# Patient Record
Sex: FEMALE | Race: WHITE | Employment: OTHER | URBAN - METROPOLITAN AREA
[De-identification: names, ages, dates, MRNs, and addresses within clinical notes are randomized per-mention and may not be internally consistent; named-entity substitution may affect disease eponyms.]

---

## 2017-01-09 ENCOUNTER — OFFICE VISIT (OUTPATIENT)
Dept: INTERNAL MEDICINE CLINIC | Age: 72
End: 2017-01-09

## 2017-01-09 VITALS
OXYGEN SATURATION: 99 % | DIASTOLIC BLOOD PRESSURE: 80 MMHG | WEIGHT: 178.6 LBS | TEMPERATURE: 97.8 F | SYSTOLIC BLOOD PRESSURE: 138 MMHG | HEIGHT: 67 IN | HEART RATE: 68 BPM | BODY MASS INDEX: 28.03 KG/M2 | RESPIRATION RATE: 12 BRPM

## 2017-01-09 DIAGNOSIS — Q61.3 POLYCYSTIC KIDNEY DISEASE: ICD-10-CM

## 2017-01-09 DIAGNOSIS — E78.49 OTHER HYPERLIPIDEMIA: ICD-10-CM

## 2017-01-09 DIAGNOSIS — R94.4 DECREASED GFR: ICD-10-CM

## 2017-01-09 DIAGNOSIS — I10 ESSENTIAL HYPERTENSION: ICD-10-CM

## 2017-01-09 DIAGNOSIS — Z00.00 MEDICARE ANNUAL WELLNESS VISIT, SUBSEQUENT: Primary | ICD-10-CM

## 2017-01-09 DIAGNOSIS — Z78.0 POST-MENOPAUSAL: ICD-10-CM

## 2017-01-09 NOTE — PROGRESS NOTES
HISTORY OF PRESENT ILLNESS  Honey Acosta is a 66 y.o. female. HPI  Cardiovascular Review:  The patient has hypertension, hyperlipidemia and Palpitations. Diet and Lifestyle: generally follows a low fat low cholesterol diet, generally follows a low sodium diet, exercises sporadically, nonsmoker Yoga and walking  Home BP Monitoring: is not measured at home. Pertinent ROS: taking medications as instructed, no medication side effects noted, no TIA's, no chest pain on exertion, no dyspnea on exertion, no swelling of ankles. Stopped Red Yeast rice due to myalgias     Interval History  CKD- followed closely by Dr. Nicolle Fuchs- in October 2016, RICE and boot at home. Improving    RLS/ Insomnia- has improved with gabapentin. She did not tolerate the     SHx: Joined the [employer] ; Taking [employer] to [place]     This is a Subsequent Rito Visit providing Personalized Prevention Plan Services (PPPS) (Performed 12 months after initial AWV and PPPS )    I have reviewed the patient's medical history in detail and updated the computerized patient record. History     Past Medical History   Diagnosis Date    Hypertension     Joint pain     Polycystic kidney disease       Past Surgical History   Procedure Laterality Date    Hx appendectomy      Hx orthopaedic      Hx partial hysterectomy       Current Outpatient Prescriptions   Medication Sig Dispense Refill    omega-3 acid ethyl esters (LOVAZA) 1 gram capsule Take 2 pills twice daily 120 Cap 2    gabapentin (NEURONTIN) 100 mg capsule TAKE 2 CAPS BY MOUTH NIGHTLY AS NEEDED. 60 Cap 4    ACIPHEX 20 mg tablet TAKE 1 TAB BY MOUTH DAILY. 90 Tab 1    lisinopril (PRINIVIL, ZESTRIL) 2.5 mg tablet TAKE 1 TAB BY MOUTH DAILY. 90 Tab 1    lisinopril (PRINIVIL, ZESTRIL) 2.5 mg tablet TAKE 1 TAB BY MOUTH DAILY.  30 Tab 3    metoprolol (LOPRESSOR) 25 mg tablet TAKE 1 AND 1/2 TABLETS BY MOUTH EVERY  Tab 2    omega-3 acid ethyl esters (LOVAZA) 1 gram capsule TAKES 2 PILLS TWICE A DAY. 120 Cap 2    TURMERIC ROOT EXTRACT PO Take  by mouth.  coenzyme q10-vitamin e 100-100 mg-unit cap Take  by mouth.  Cholecalciferol, Vitamin D3, (VITAMIN D3) 2,000 unit cap capsule Take  by mouth two (2) times a day.  estradiol (ESTRACE) 0.01 % (0.1 mg/gram) vaginal cream Insert 2 g into vagina daily.  fexofenadine (ALLEGRA) 180 mg tablet Take  by mouth.  ramelteon (ROZEREM) 8 mg tablet Take 1 Tab by mouth nightly as needed for Sleep. 30 mins before bedtime 30 Tab 1    mometasone (NASONEX) 50 mcg/actuation nasal spray 2 Sprays by Both Nostrils route two (2) times a day.  red yeast rice extract 600 mg cap Take 600 mg by mouth now. Allergies   Allergen Reactions    Codeine Nausea Only    Nitroglycerin Other (comments)    Statins-Hmg-Coa Reductase Inhibitors Myalgia     Family History   Problem Relation Age of Onset    Cancer Brother      Social History   Substance Use Topics    Smoking status: Never Smoker    Smokeless tobacco: Not on file    Alcohol use 0.0 oz/week     0 Standard drinks or equivalent per week     Patient Active Problem List   Diagnosis Code    GERD (gastroesophageal reflux disease) K21.9    HTN (hypertension) I10    HLD (hyperlipidemia) E78.5    Polycystic kidney disease Q61.3    PVC's (premature ventricular contractions) I49.3    RBBB I45.10       Depression Risk Factor Screening:     PHQ 2 / 9, over the last two weeks 1/9/2017   Little interest or pleasure in doing things Not at all   Feeling down, depressed or hopeless Not at all   Total Score PHQ 2 0     Alcohol Risk Factor Screening: On any occasion during the past 3 months, have you had more than 3 drinks containing alcohol? Yes    Do you average more than 7 drinks per week? No      Functional Ability and Level of Safety:     Hearing Loss   none    Activities of Daily Living   Self-care.    Requires assistance with: no ADLs    Fall Risk     Fall Risk Assessment, last 12 mths 1/9/2017   Able to walk? Yes   Fall in past 12 months? Yes   Fall with injury? Yes   Number of falls in past 12 months 1   Fall Risk Score 2     Abuse Screen   Patient is not abused lives with [facility]- 43 years in April, ROS per HPI    Physical Exam   Constitutional: She is oriented to person, place, and time. She appears well-developed and well-nourished. HENT:   Right Ear: External ear normal.   Left Ear: External ear normal.   Mouth/Throat: Oropharynx is clear and moist. No oropharyngeal exudate. Eyes: Conjunctivae are normal. No scleral icterus. Neck: Normal range of motion. Neck supple. No thyromegaly present. Cardiovascular: Normal rate, regular rhythm and normal heart sounds. Exam reveals no gallop and no friction rub. No murmur heard. Pulmonary/Chest: Effort normal and breath sounds normal. No respiratory distress. She has no wheezes. She has no rales. She exhibits no tenderness. Abdominal: Soft. Bowel sounds are normal. She exhibits no distension. There is no tenderness. There is no rebound and no guarding. Genitourinary:   Genitourinary Comments: Not indicated. Musculoskeletal: Normal range of motion. She exhibits no edema or tenderness. Neurological: She is alert and oriented to person, place, and time. Skin:   Multiple nevi on trunk s/p derm evaluation <12mths. Pt endorses no worrisome changes     Psychiatric: She has a normal mood and affect.         Evaluation of Cognitive Function:  Mood/affect:  Happy   Appearance: younger than stated age  Family member/caregiver input: n/a         Patient Care Team:  Scot Castellanos MD as PCP - General (Internal Medicine)    ASSESSMENT and PLAN  Advice/Referrals/Counseling   Education and counseling provided:  Are appropriate based on today's review and evaluation  Pneumococcal Vaccine- will confirm receipt     Assessment/Plan   Helen Cody was seen today for annual wellness visit.    Diagnoses and all orders for this visit:    Medicare annual wellness visit, subsequent- Health Maintenance reviewed     Other hyperlipidemia- check Lipids.   -     LIPID PANEL    Post-menopausal- due for DEXA  -     DEXA BONE DENSITY STUDY AXIAL; Future    Essential hypertension- well controlled. Counseled on diet and exercise. Continue current regimen. Decreased GFR- per Nephrology. Records reviewed    Polycystic kidney disease- per Urology. Obtain records       Follow-up Disposition:  Return in about 6 months (around 7/9/2017) for Follow-up HLD . Medication risks/benefits/costs/interactions/alternatives discussed with patient. Lea Lundy  was given an after visit summary which includes diagnoses, current medications, & vitals. she expressed understanding with the diagnosis and plan.

## 2017-01-13 ENCOUNTER — TELEPHONE (OUTPATIENT)
Dept: INTERNAL MEDICINE CLINIC | Age: 72
End: 2017-01-13

## 2017-01-13 NOTE — TELEPHONE ENCOUNTER
[facility] [phone]     Pharm calling back to let patel know that this pt has only had flu shots with them the last one being 9/30/16, they did receive a script for the [id number] vaccine, but that she never had it done.

## 2017-01-23 RX ORDER — METOPROLOL TARTRATE 25 MG/1
TABLET, FILM COATED ORAL
Qty: 135 TAB | Refills: 2 | Status: SHIPPED | OUTPATIENT
Start: 2017-01-23 | End: 2018-01-16 | Stop reason: SDUPTHER

## 2017-02-21 ENCOUNTER — HOSPITAL ENCOUNTER (OUTPATIENT)
Dept: BONE DENSITY | Age: 72
Discharge: HOME | End: 2017-02-21
Attending: INTERNAL MEDICINE
Payer: MEDICARE

## 2017-02-21 DIAGNOSIS — Z78.0 POST-MENOPAUSAL: ICD-10-CM

## 2017-02-21 PROCEDURE — 77080 DXA BONE DENSITY AXIAL: CPT

## 2017-03-01 ENCOUNTER — DOCUMENTATION ONLY (OUTPATIENT)
Dept: INTERNAL MEDICINE CLINIC | Age: 72
End: 2017-03-01

## 2017-03-22 DIAGNOSIS — N60.09 BREAST CYST, UNSPECIFIED LATERALITY: Primary | ICD-10-CM

## 2017-04-17 ENCOUNTER — OFFICE VISIT (OUTPATIENT)
Dept: SURGERY | Age: 72
End: 2017-04-17

## 2017-04-17 ENCOUNTER — DOCUMENTATION ONLY (OUTPATIENT)
Dept: SURGERY | Age: 72
End: 2017-04-17

## 2017-04-17 VITALS
WEIGHT: 178 LBS | DIASTOLIC BLOOD PRESSURE: 79 MMHG | BODY MASS INDEX: 27.94 KG/M2 | HEIGHT: 67 IN | SYSTOLIC BLOOD PRESSURE: 136 MMHG | HEART RATE: 78 BPM

## 2017-04-17 DIAGNOSIS — N60.02 BILATERAL BREAST CYSTS: Primary | ICD-10-CM

## 2017-04-17 DIAGNOSIS — N60.01 BILATERAL BREAST CYSTS: Primary | ICD-10-CM

## 2017-04-17 NOTE — LETTER
4/17/2017 11:00 AM 
 
Patient:  Alphonso Owusu YOB: 1945 Date of Visit: 4/17/2017 Dear Pamela Marie MD 
[address] [place] [address] [address] [phone] VIA In Basket 
 : Thank you for referring Ms. Lawanda Johnson to me for evaluation/treatment. Below are the relevant portions of my assessment and plan of care. HISTORY OF PRESENT ILLNESS 
Alphonso Owusu is a 66 y.o. female. HPI NEW Patient presents for consultation at the request of Dr. Marla Knapp for second opinion of abnormal mammogram. Patient is not able to palpate any lumps. She states that she has dense breasts. No history of breast biopsies or breast surgeries. No breast pain. Mammogram and ultrasound done 2/16/17 at [facility]: BI-RADS 4. Report scanned into [facility]. Complex cyst at 9:00 bilateral breasts measuring 5 mm and 3 mm. FH: 
- Maternal grandmother diagnosed with breast cancer in her 66's. - Maternal cousin diagnosed with breast cancer in her 66's. Past Medical History:  
Diagnosis Date  Hypercholesterolemia  Hypertension  Joint pain  Melanoma ([address]  Polycystic kidney disease  Polycystic kidney disease  Restless leg syndrome Past Surgical History:  
Procedure Laterality Date  HX APPENDECTOMY  HX BUNIONECTOMY  2002  HX ORTHOPAEDIC    
 HX OTHER SURGICAL  1995  
 surgery for melanoma  HX OTHER SURGICAL Bilateral 2008 and 2014  
 aspiration of cysts BILATERAL kidneys  HX PARTIAL HYSTERECTOMY Social History Social History  Marital status:  Spouse name: N/A  
 Number of children: N/A  
 Years of education: N/A Occupational History  Not on file. Social History Main Topics  Smoking status: Never Smoker  Smokeless tobacco: Not on file  Alcohol use 0.6 oz/week 1 Standard drinks or equivalent per week  Drug use: No  
 Sexual activity: No  
 
Other Topics Concern  Not on file Social History Narrative OB History Obstetric Comments Menarche:  13. LMP: 50.  # of Children:  1. Age at Delivery of First Child:  33.   Hysterectomy/oophorectomy:  YES/NO. Breast Bx:  no.  Hx of Breast Feeding:  no. BCP:  yes. Hormone therapy:  yes. Current Outpatient Prescriptions:  
  metoprolol tartrate (LOPRESSOR) 25 mg tablet, TAKE 1 & 1/2 TABLET BY MOUTH EVERY DAY, Disp: 135 Tab, Rfl: 2 
  omega-3 acid ethyl esters (LOVAZA) 1 gram capsule, Take 2 pills twice daily, Disp: 120 Cap, Rfl: 2 
  gabapentin (NEURONTIN) 100 mg capsule, TAKE 2 CAPS BY MOUTH NIGHTLY AS NEEDED., Disp: 60 Cap, Rfl: 4 
  ACIPHEX 20 mg tablet, TAKE 1 TAB BY MOUTH DAILY. , Disp: 90 Tab, Rfl: 1 
  lisinopril (PRINIVIL, ZESTRIL) 2.5 mg tablet, TAKE 1 TAB BY MOUTH DAILY. , Disp: 30 Tab, Rfl: 3 
  omega-3 acid ethyl esters (LOVAZA) 1 gram capsule, TAKES 2 PILLS TWICE A DAY., Disp: 120 Cap, Rfl: 2 
  TURMERIC ROOT EXTRACT PO, Take  by mouth., Disp: , Rfl:  
  coenzyme q10-vitamin e 100-100 mg-unit cap, Take  by mouth., Disp: , Rfl:  
  Cholecalciferol, Vitamin D3, (VITAMIN D3) 2,000 unit cap capsule, Take  by mouth two (2) times a day., Disp: , Rfl:  
  estradiol (ESTRACE) 0.01 % (0.1 mg/gram) vaginal cream, Insert 2 g into vagina daily. , Disp: , Rfl:  
  fexofenadine (ALLEGRA) 180 mg tablet, Take  by mouth., Disp: , Rfl:  
Allergies Allergen Reactions  Codeine Nausea Only  Nitroglycerin Other (comments)  Red Yeast Rice Myalgia  Rozerem [Ramelteon] Other (comments) Headache  Statins-Hmg-Coa Reductase Inhibitors Myalgia Review of Systems Constitutional: Negative. Weight gain HENT: Negative. Eyes: Negative. Respiratory: Negative. Cardiovascular: Negative. Gastrointestinal: Negative. Genitourinary: Positive for frequency. Musculoskeletal: Positive for back pain and joint pain. Skin: Negative. Neurological: Negative. Endo/Heme/Allergies: Negative. Psychiatric/Behavioral: Negative. Physical Exam  
Constitutional: She is oriented to person, place, and time. She appears well-developed and well-nourished. HENT:  
Head: Normocephalic. Eyes: EOM are normal.  
Neck: Neck supple. Cardiovascular: Intact distal pulses. Pulmonary/Chest: Effort normal. Right breast exhibits no inverted nipple, no mass, no nipple discharge, no skin change and no tenderness. Left breast exhibits no inverted nipple, no mass, no nipple discharge, no skin change and no tenderness. Breasts are symmetrical.  
Abdominal: Soft. Musculoskeletal: Normal range of motion. Lymphadenopathy:  
  She has no cervical adenopathy. She has no axillary adenopathy. Neurological: She is alert and oriented to person, place, and time. Skin: Skin is warm and dry. Psychiatric: She has a normal mood and affect. Nursing note and vitals reviewed. ASSESSMENT and PLAN 
  ICD-10-CM ICD-9-CM 1. Bilateral breast cysts N60.01 610.0   
 N60.02    
 
65 yo female with bilateral complex cysts. I have reviewed her imaging and reports. I gave her the option of 6 month follow-up ultrasound vs. Core needle biopsies. She would like to opt for the ultrasound. It has already been 2 months since prior ultrasound so will schedule her for us in august. I did explain without tissue no way of knowing exactly what it is. She understood. If any change on next us recommend core needle biopsies. She was comfortable with the plan. I will see her back after the us. If you have questions, please do not hesitate to call me. I look forward to following Ms. Foley along with you. Sincerely, Blanca Sellers MD

## 2017-04-17 NOTE — PROGRESS NOTES
HISTORY OF PRESENT ILLNESS  Rosetta Hyde is a 66 y.o. female. HPI NEW Patient presents for consultation at the request of Dr. Riccardo Santiago for second opinion of abnormal mammogram. Patient is not able to palpate any lumps. She states that she has dense breasts. No history of breast biopsies or breast surgeries. No breast pain. Mammogram and ultrasound done 2/16/17 at [facility]: BI-RADS 4. Report scanned into [facility]. Complex cyst at 9:00 bilateral breasts measuring 5 mm and 3 mm. FH:  - Maternal grandmother diagnosed with breast cancer in her 66's. - Maternal cousin diagnosed with breast cancer in her 66's. Past Medical History:   Diagnosis Date    Hypercholesterolemia     Hypertension     Joint pain     [address] [address]     Polycystic kidney disease     Polycystic kidney disease     Restless leg syndrome      Past Surgical History:   Procedure Laterality Date    HX APPENDECTOMY      HX BUNIONECTOMY  2002    HX ORTHOPAEDIC      [address]    surgery for melanoma     HX OTHER SURGICAL Bilateral 2008 and 2014    aspiration of cysts BILATERAL kidneys    HX PARTIAL HYSTERECTOMY       Social History     Social History    Marital status:      Spouse name: N/A    Number of children: N/A    Years of education: N/A     Occupational History    Not on file. Social History Main Topics    Smoking status: Never Smoker    Smokeless tobacco: Not on file    Alcohol use 0.6 oz/week     1 Standard drinks or equivalent per week    Drug use: No    Sexual activity: No     Other Topics Concern    Not on file     Social History Narrative     OB History     Obstetric Comments    Menarche:  13. LMP: 50.  # of Children:  1. Age at Delivery of First Child:  33.   Hysterectomy/oophorectomy:  YES/NO. Breast Bx:  no.  Hx of Breast Feeding:  no. BCP:  yes. Hormone therapy:  yes.                Current Outpatient Prescriptions:     metoprolol tartrate (LOPRESSOR) 25 mg tablet, TAKE 1 & 1/2 TABLET BY MOUTH EVERY DAY, Disp: 135 Tab, Rfl: 2    omega-3 acid ethyl esters (LOVAZA) 1 gram capsule, Take 2 pills twice daily, Disp: 120 Cap, Rfl: 2    gabapentin (NEURONTIN) 100 mg capsule, TAKE 2 CAPS BY MOUTH NIGHTLY AS NEEDED., Disp: 60 Cap, Rfl: 4    ACIPHEX 20 mg tablet, TAKE 1 TAB BY MOUTH DAILY. , Disp: 90 Tab, Rfl: 1    lisinopril (PRINIVIL, ZESTRIL) 2.5 mg tablet, TAKE 1 TAB BY MOUTH DAILY. , Disp: 30 Tab, Rfl: 3    omega-3 acid ethyl esters (LOVAZA) 1 gram capsule, TAKES 2 PILLS TWICE A DAY., Disp: 120 Cap, Rfl: 2    TURMERIC ROOT EXTRACT PO, Take  by mouth., Disp: , Rfl:     coenzyme q10-vitamin e 100-100 mg-unit cap, Take  by mouth., Disp: , Rfl:     Cholecalciferol, Vitamin D3, (VITAMIN D3) 2,000 unit cap capsule, Take  by mouth two (2) times a day., Disp: , Rfl:     estradiol (ESTRACE) 0.01 % (0.1 mg/gram) vaginal cream, Insert 2 g into vagina daily. , Disp: , Rfl:     fexofenadine (ALLEGRA) 180 mg tablet, Take  by mouth., Disp: , Rfl:   Allergies   Allergen Reactions    Codeine Nausea Only    Nitroglycerin Other (comments)    Red Yeast Rice Myalgia    Rozerem [Ramelteon] Other (comments)     Headache     Statins-Hmg-Coa Reductase Inhibitors Myalgia       Review of Systems   Constitutional: Negative. Weight gain   HENT: Negative. Eyes: Negative. Respiratory: Negative. Cardiovascular: Negative. Gastrointestinal: Negative. Genitourinary: Positive for frequency. Musculoskeletal: Positive for back pain and joint pain. Skin: Negative. Neurological: Negative. Endo/Heme/Allergies: Negative. Psychiatric/Behavioral: Negative. Physical Exam   Constitutional: She is oriented to person, place, and time. She appears well-developed and well-nourished. HENT:   Head: Normocephalic. Eyes: EOM are normal.   Neck: Neck supple. Cardiovascular: Intact distal pulses.     Pulmonary/Chest: Effort normal. Right breast exhibits no inverted nipple, no mass, no nipple discharge, no skin change and no tenderness. Left breast exhibits no inverted nipple, no mass, no nipple discharge, no skin change and no tenderness. Breasts are symmetrical.   Abdominal: Soft. Musculoskeletal: Normal range of motion. Lymphadenopathy:     She has no cervical adenopathy. She has no axillary adenopathy. Neurological: She is alert and oriented to person, place, and time. Skin: Skin is warm and dry. Psychiatric: She has a normal mood and affect. Nursing note and vitals reviewed. ASSESSMENT and PLAN    ICD-10-CM ICD-9-CM    1. Bilateral breast cysts N60.01 610.0     N60.02       65 yo female with bilateral complex cysts. I have reviewed her imaging and reports. I gave her the option of 6 month follow-up ultrasound vs. Core needle biopsies. She would like to opt for the ultrasound. It has already been 2 months since prior ultrasound so will schedule her for us in august. I did explain without tissue no way of knowing exactly what it is. She understood. If any change on next us recommend core needle biopsies. She was comfortable with the plan. I will see her back after the us.

## 2017-04-17 NOTE — PATIENT INSTRUCTIONS
Breast Self-Exam: Care Instructions  Your Care Instructions  A breast self-exam is when you check your breasts for lumps or changes. This regular exam helps you learn how your breasts normally look and feel. Most breast problems or changes are not because of cancer. Breast self-exam is not a substitute for a mammogram. Having regular breast exams by your doctor and regular mammograms improve your chances of finding any problems with your breasts. Some women set a time each month to do a step-by-step breast self-exam. Other women like a less formal system. They might look at their breasts as they brush their teeth, or feel their breasts once in a while in the shower. If you notice a change in your breast, tell your doctor. Follow-up care is a key part of your treatment and safety. Be sure to make and go to all appointments, and call your doctor if you are having problems. Its also a good idea to know your test results and keep a list of the medicines you take. How do you do a breast self-exam?  · The best time to examine your breasts is usually one week after your menstrual period begins. Your breasts should not be tender then. If you do not have periods, you might do your exam on a day of the month that is easy to remember. · To examine your breasts:  ¨ Remove all your clothes above the waist and lie down. When you are lying down, your breast tissue spreads evenly over your chest wall, which makes it easier to feel all your breast tissue. ¨ Use the pads--not the fingertips--of the 3 middle fingers of your left hand to check your right breast. Move your fingers slowly in small coin-sized circles that overlap. ¨ Use three levels of pressure to feel of all your breast tissue. Use light pressure to feel the tissue close to the skin surface. Use medium pressure to feel a little deeper. Use firm pressure to feel your tissue close to your breastbone and ribs.  Use each pressure level to feel your breast tissue before moving on to the next spot. ¨ Check your entire breast, moving up and down as if following a strip from the collarbone to the bra line, and from the armpit to the ribs. Repeat until you have covered the entire breast.  ¨ Repeat this procedure for your left breast, using the pads of the 3 middle fingers of your right hand. · To examine your breasts while in the shower:  ¨ Place one arm over your head and lightly soap your breast on that side. ¨ Using the pads of your fingers, gently move your hand over your breast (in the strip pattern described above), feeling carefully for any lumps or changes. ¨ Repeat for the other breast.  · Have your doctor inspect anything you notice to see if you need further testing. Where can you learn more? Go to http://sergo-renny.info/. Enter P148 in the search box to learn more about \"Breast Self-Exam: Care Instructions. \"  Current as of: July 26, 2016  Content Version: 11.2  © 9588-9195 [employer]. Care instructions adapted under license by [employer] (which disclaims liability or warranty for this information). If you have questions about a medical condition or this instruction, always ask your healthcare professional. [place] [address] any warranty or liability for your use of this information.

## 2017-04-17 NOTE — COMMUNICATION BODY
HISTORY OF PRESENT ILLNESS  Honey Diggs is a 66 y.o. female. HPI NEW Patient presents for consultation at the request of Dr. Gerhardt Shy for second opinion of abnormal mammogram. Patient is not able to palpate any lumps. She states that she has dense breasts. No history of breast biopsies or breast surgeries. No breast pain. Mammogram and ultrasound done 2/16/17 at [facility]: BI-RADS 4. Report scanned into [facility]. Complex cyst at 9:00 bilateral breasts measuring 5 mm and 3 mm. FH:  - Maternal grandmother diagnosed with breast cancer in her 66's. - Maternal cousin diagnosed with breast cancer in her 66's. Past Medical History:   Diagnosis Date    Hypercholesterolemia     Hypertension     Joint pain     Melanoma ([facility] [address]     Polycystic kidney disease     Polycystic kidney disease     Restless leg syndrome      Past Surgical History:   Procedure Laterality Date    HX APPENDECTOMY      HX BUNIONECTOMY  2002    HX ORTHOPAEDIC      [address]    surgery for melanoma     HX OTHER SURGICAL Bilateral 2008 and 2014    aspiration of cysts BILATERAL kidneys    HX PARTIAL HYSTERECTOMY       Social History     Social History    Marital status:      Spouse name: N/A    Number of children: N/A    Years of education: N/A     Occupational History    Not on file. Social History Main Topics    Smoking status: Never Smoker    Smokeless tobacco: Not on file    Alcohol use 0.6 oz/week     1 Standard drinks or equivalent per week    Drug use: No    Sexual activity: No     Other Topics Concern    Not on file     Social History Narrative     OB History     Obstetric Comments    Menarche:  13. LMP: 50.  # of Children:  1. Age at Delivery of First Child:  33.   Hysterectomy/oophorectomy:  YES/NO. Breast Bx:  no.  Hx of Breast Feeding:  no. BCP:  yes. Hormone therapy:  yes.                Current Outpatient Prescriptions:     metoprolol tartrate (LOPRESSOR) 25 mg tablet, TAKE 1 & 1/2 TABLET BY MOUTH EVERY DAY, Disp: 135 Tab, Rfl: 2    omega-3 acid ethyl esters (LOVAZA) 1 gram capsule, Take 2 pills twice daily, Disp: 120 Cap, Rfl: 2    gabapentin (NEURONTIN) 100 mg capsule, TAKE 2 CAPS BY MOUTH NIGHTLY AS NEEDED., Disp: 60 Cap, Rfl: 4    ACIPHEX 20 mg tablet, TAKE 1 TAB BY MOUTH DAILY. , Disp: 90 Tab, Rfl: 1    lisinopril (PRINIVIL, ZESTRIL) 2.5 mg tablet, TAKE 1 TAB BY MOUTH DAILY. , Disp: 30 Tab, Rfl: 3    omega-3 acid ethyl esters (LOVAZA) 1 gram capsule, TAKES 2 PILLS TWICE A DAY., Disp: 120 Cap, Rfl: 2    TURMERIC ROOT EXTRACT PO, Take  by mouth., Disp: , Rfl:     coenzyme q10-vitamin e 100-100 mg-unit cap, Take  by mouth., Disp: , Rfl:     Cholecalciferol, Vitamin D3, (VITAMIN D3) 2,000 unit cap capsule, Take  by mouth two (2) times a day., Disp: , Rfl:     estradiol (ESTRACE) 0.01 % (0.1 mg/gram) vaginal cream, Insert 2 g into vagina daily. , Disp: , Rfl:     fexofenadine (ALLEGRA) 180 mg tablet, Take  by mouth., Disp: , Rfl:   Allergies   Allergen Reactions    Codeine Nausea Only    Nitroglycerin Other (comments)    Red Yeast Rice Myalgia    Rozerem [Ramelteon] Other (comments)     Headache     Statins-Hmg-Coa Reductase Inhibitors Myalgia       Review of Systems   Constitutional: Negative. Weight gain   HENT: Negative. Eyes: Negative. Respiratory: Negative. Cardiovascular: Negative. Gastrointestinal: Negative. Genitourinary: Positive for frequency. Musculoskeletal: Positive for back pain and joint pain. Skin: Negative. Neurological: Negative. Endo/Heme/Allergies: Negative. Psychiatric/Behavioral: Negative. Physical Exam   Constitutional: She is oriented to person, place, and time. She appears well-developed and well-nourished. HENT:   Head: Normocephalic. Eyes: EOM are normal.   Neck: Neck supple. Cardiovascular: Intact distal pulses.     Pulmonary/Chest: Effort normal. Right breast exhibits no inverted nipple, no mass, no nipple discharge, no skin change and no tenderness. Left breast exhibits no inverted nipple, no mass, no nipple discharge, no skin change and no tenderness. Breasts are symmetrical.   Abdominal: Soft. Musculoskeletal: Normal range of motion. Lymphadenopathy:     She has no cervical adenopathy. She has no axillary adenopathy. Neurological: She is alert and oriented to person, place, and time. Skin: Skin is warm and dry. Psychiatric: She has a normal mood and affect. Nursing note and vitals reviewed. ASSESSMENT and PLAN    ICD-10-CM ICD-9-CM    1. Bilateral breast cysts N60.01 610.0     N60.02       65 yo female with bilateral complex cysts. I have reviewed her imaging and reports. I gave her the option of 6 month follow-up ultrasound vs. Core needle biopsies. She would like to opt for the ultrasound. It has already been 2 months since prior ultrasound so will schedule her for us in august. I did explain without tissue no way of knowing exactly what it is. She understood. If any change on next us recommend core needle biopsies. She was comfortable with the plan. I will see her back after the us.

## 2017-05-03 ENCOUNTER — DOCUMENTATION ONLY (OUTPATIENT)
Dept: SURGERY | Age: 72
End: 2017-05-03

## 2017-05-03 NOTE — PROGRESS NOTES
Type of Film: [x] CD [] FILMS  Type of Test: [] MRI [] MAMMO  From: [facility]  Given to: mailed back to patient's home address  Downloaded into PACS:  YES

## 2017-05-09 DIAGNOSIS — G25.81 RLS (RESTLESS LEGS SYNDROME): ICD-10-CM

## 2017-05-10 RX ORDER — GABAPENTIN 100 MG/1
200 CAPSULE ORAL
Qty: 60 CAP | Refills: 4 | Status: SHIPPED | OUTPATIENT
Start: 2017-05-10 | End: 2017-10-08 | Stop reason: SDUPTHER

## 2017-05-10 NOTE — TELEPHONE ENCOUNTER
From: Juni Giang  To: Lorie Zafar MD  Sent: 5/9/2017 10:39 AM EDT  Subject: Medication Renewal Request    Original authorizing provider: MD Juni Gutiérrez would like a refill of the following medications:  gabapentin (NEURONTIN) 100 mg capsule Lorie Zafar MD]    Preferred pharmacy: [address]:

## 2017-05-13 DIAGNOSIS — G25.81 RLS (RESTLESS LEGS SYNDROME): Primary | ICD-10-CM

## 2017-05-15 NOTE — TELEPHONE ENCOUNTER
From: Bonifacio David  To: Shani Truong MD  Sent: 5/13/2017 9:02 AM EDT  Subject: Medication Renewal Request    Original authorizing provider: MD Bonifacio Koo would like a refill of the following medications:  ACIPHEX 20 mg tablet Shani Truong MD]    Preferred pharmacy: [facility]:

## 2017-05-16 RX ORDER — RABEPRAZOLE SODIUM 20 MG/1
20 TABLET, DELAYED RELEASE ORAL DAILY
Qty: 90 TAB | Refills: 1 | Status: SHIPPED | OUTPATIENT
Start: 2017-05-16 | End: 2017-06-10 | Stop reason: SDUPTHER

## 2017-06-05 DIAGNOSIS — E78.5 HYPERLIPIDEMIA, UNSPECIFIED HYPERLIPIDEMIA TYPE: Primary | ICD-10-CM

## 2017-06-08 RX ORDER — OMEGA-3-ACID ETHYL ESTERS 1 G/1
CAPSULE, LIQUID FILLED ORAL
Qty: 120 CAP | Refills: 2 | Status: SHIPPED | OUTPATIENT
Start: 2017-06-08 | End: 2017-09-06 | Stop reason: SDUPTHER

## 2017-06-08 NOTE — TELEPHONE ENCOUNTER
From: Arbie Burkitt  To: Rosalva Duncan MD  Sent: 6/5/2017 8:55 PM EDT  Subject: Medication Renewal Request    Original authorizing provider: Lurlean Berber, MD Arbie Burkitt would like a refill of the following medications:  omega-3 acid ethyl esters (LOVAZA) 1 gram capsule Rosalva Duncan MD]    [facility] [address]

## 2017-06-10 RX ORDER — RABEPRAZOLE SODIUM 20 MG/1
TABLET, DELAYED RELEASE ORAL
Qty: 90 TAB | Refills: 1 | Status: SHIPPED | OUTPATIENT
Start: 2017-06-10 | End: 2018-04-06 | Stop reason: SDUPTHER

## 2017-06-26 ENCOUNTER — TELEPHONE (OUTPATIENT)
Dept: INTERNAL MEDICINE CLINIC | Age: 72
End: 2017-06-26

## 2017-06-26 DIAGNOSIS — Z71.3 NUTRITIONAL COUNSELING: Primary | ICD-10-CM

## 2017-07-10 ENCOUNTER — OFFICE VISIT (OUTPATIENT)
Dept: INTERNAL MEDICINE CLINIC | Age: 72
End: 2017-07-10

## 2017-07-10 VITALS
TEMPERATURE: 97.9 F | SYSTOLIC BLOOD PRESSURE: 136 MMHG | WEIGHT: 177 LBS | HEART RATE: 75 BPM | BODY MASS INDEX: 27.78 KG/M2 | RESPIRATION RATE: 15 BRPM | HEIGHT: 67 IN | DIASTOLIC BLOOD PRESSURE: 90 MMHG | OXYGEN SATURATION: 98 %

## 2017-07-10 DIAGNOSIS — Q61.3 POLYCYSTIC KIDNEY DISEASE: ICD-10-CM

## 2017-07-10 DIAGNOSIS — E78.2 MIXED HYPERLIPIDEMIA: ICD-10-CM

## 2017-07-10 DIAGNOSIS — G25.81 RESTLESS LEGS SYNDROME (RLS): Primary | ICD-10-CM

## 2017-07-10 DIAGNOSIS — I10 ESSENTIAL HYPERTENSION: ICD-10-CM

## 2017-07-10 PROBLEM — M48.061 SPINAL STENOSIS OF LUMBAR REGION: Status: ACTIVE | Noted: 2017-07-10

## 2017-07-10 RX ORDER — ACETAMINOPHEN 500 MG
1000 TABLET ORAL
COMMUNITY

## 2017-07-10 RX ORDER — LISINOPRIL 2.5 MG/1
TABLET ORAL
Qty: 90 TAB | Refills: 3 | Status: SHIPPED | OUTPATIENT
Start: 2017-07-10 | End: 2018-01-16 | Stop reason: SDUPTHER

## 2017-07-10 RX ORDER — MELATONIN 10 MG
1 CAPSULE ORAL
COMMUNITY

## 2017-07-10 NOTE — PATIENT INSTRUCTIONS
It was a pleasure to see you! As discussed:    Congratulations on your weight loss and positive lifestyle changes!!!    I have ordered your age appropriate labs please complete them. You will need to fast 10-12hrs before your lab appointment. Complete labs two weeks before your next appointment in January. If no improvement in cholesterol I will have you see Cardiology to discuss a PCKS9 Inhibitor. Keep a log of your restless leg symptoms. If worsens we will repeat the imaging of your back    Do not hesitate to contact the office if you have any questions or concerns before your next appointment. Restless Legs Syndrome: Care Instructions  Your Care Instructions  Restless legs syndrome is a common nervous system problem. People with this syndrome feel a creeping, achy, or unpleasant feeling in the legs and an overpowering urge to move them. It often occurs in the evening and at night and can lead to sleep problems and tiredness. Your doctor may suggest doing a study of your sleep patterns to figure out what is happening when you try to sleep. Many people get relief from symptoms when they get regular exercise, eat well, and avoid caffeine, alcohol, and tobacco.  Follow-up care is a key part of your treatment and safety. Be sure to make and go to all appointments, and call your doctor if you are having problems. It's also a good idea to know your test results and keep a list of the medicines you take. How can you care for yourself at home? · Take your medicines exactly as prescribed. Call your doctor if you think you are having a problem with your medicine. · Try bathing in hot or cold water. Applying a heating pad or ice bag to your legs may also help symptoms. · Stretch and massage your legs before bed or when discomfort begins. · Get some exercise for at least 30 minutes a day on most days of the week. Stop exercising at least 3 hours before bedtime.   · Try to plan for situations where you will need to remain seated for long stretches. For example, if you are traveling by car, plan some stops so you can get out and walk around. · Tell your doctor about any medicines you are taking. This includes all over-the-counter, prescription, and herbal medicines. Some medicines, such as antidepressants, antihistamines, and cold and sinus medicines, can make your symptoms worse. · Avoid caffeine products, such as coffee, tea, cola, and chocolate. Caffeine can interrupt your sleep and stimulate you. · Do not smoke. Nicotine can make restless legs worse. If you need help quitting, talk to your doctor about stop-smoking programs and medicines. These can increase your chances of quitting for good. · Do not drink alcohol late in the evening. Take steps to help you sleep better  · Get plenty of sunlight in the outdoors, particularly later in the afternoon. · Use the evening hours for settling down. Avoid activities that challenge you in the hours before bedtime. · Eat meals at regular times, and do not snack before bedtime. · Keep your bedroom quiet, dark, and cool. Try using a sleep mask and earplugs to help you sleep. · Limit how much you drink at night to reduce your need to get up to urinate. But do not go to bed thirsty. · Run a fan or other steady \"white noise\" during the night if noises wake you up. · [place] the bed for sleeping and sex. Do your reading or TV watching in another room. · Once you are in bed, relax from head to toe, and guide your mind to pleasant thoughts. · Do not stay in bed longer than 8 hours, and try to avoid naps. · If your symptoms usually improve around 4 a.m. to 6 a.m., try going to bed later than usual or allowing extra time for sleeping in to help you get the rest you need. When should you call for help? Watch closely for changes in your health, and be sure to contact your doctor if:  · You are still not getting enough sleep.   · Your symptoms become more severe or happen more often. Where can you learn more? Go to http://sergo-renny.info/. Enter X965 in the search box to learn more about \"Restless Legs Syndrome: Care Instructions. \"  Current as of: October 14, 2016  Content Version: 11.3  © 5376-2432 [employer]. Care instructions adapted under license by [employer] (which disclaims liability or warranty for this information). If you have questions about a medical condition or this instruction, always ask your healthcare professional. [address] any warranty or liability for your use of this information.

## 2017-07-10 NOTE — PROGRESS NOTES
Chief Complaint   Patient presents with    Hypertension     1. Have you been to the ER, urgent care clinic since your last visit? Hospitalized since your last visit? No    2. Have you seen or consulted any other health care providers outside of the [address] [place] [address] since your last visit? Include any pap smears or colon screening. Yes When: 2 weeks ago Where: [facility] Reason for visit: follow up/Dr Jayme Evans, kidney specialist, 6 months ago.       Patient states restless legs-gabapentin works but legs feel like burning and aching in AM    Cholesterol

## 2017-07-10 NOTE — PROGRESS NOTES
HISTORY OF PRESENT ILLNESS  Abril Davis is a 66 y.o. female. HPI    Cardiovascular Review:  The patient has hypertension, HLD, and CKD, PCKD, palpitations . Diet and Lifestyle: generally follows a low fat low cholesterol diet, generally follows a low sodium diet, nonsmoker, followed by Wilmer Lara. Has appointment today. Working on decreasing sweets   Home BP Monitoring: is not measured at home. Pertinent ROS: taking medications as instructed, no medication side effects noted, no TIA's, no chest pain on exertion, no dyspnea on exertion, no swelling of ankles. Had questions about PCSK9 Inhibitor. RLS/ Insomnia  Has improved with gabapentin. However some mornings she wakes up with leg pain that easily resolves with walking. She has not        Interval History  CKD- followed closely by Dr. Richard Cochran. Interval records reviewed. Dr. Radhika Bridges: Joined the [employer] ; Just played at [facility]. Review of Systems   Constitutional: Negative for diaphoresis, fever and weight loss. Eyes: Negative for blurred vision and pain. Respiratory: Negative for shortness of breath. Cardiovascular: Negative for chest pain, orthopnea and leg swelling. Neurological: Negative for focal weakness and headaches. Psychiatric/Behavioral: Negative for depression. Patient Active Problem List    Diagnosis Date Noted    Spinal stenosis of lumbar region 07/10/2017    Bilateral breast cysts 04/17/2017    GERD (gastroesophageal reflux disease) 09/10/2015    HTN (hypertension) 09/10/2015    HLD (hyperlipidemia) 09/10/2015    Polycystic kidney disease 09/10/2015    PVC's (premature ventricular contractions) 09/10/2015    RBBB 09/10/2015       Current Outpatient Prescriptions   Medication Sig Dispense Refill    lisinopril (PRINIVIL, ZESTRIL) 2.5 mg tablet TAKE 1 TAB BY MOUTH DAILY. 90 Tab 3    melatonin 10 mg cap Take 1 Tab by mouth nightly.       acetaminophen (TYLENOL EXTRA STRENGTH) 500 mg tablet Take 500 mg by mouth nightly.  OTHER Take  by mouth daily. Indications: Flax      ACIPHEX 20 mg tablet TAKE 1 TAB BY MOUTH DAILY. 90 Tab 1    omega-3 acid ethyl esters (LOVAZA) 1 gram capsule Take 2 pills twice daily 120 Cap 2    gabapentin (NEURONTIN) 100 mg capsule Take 2 Caps by mouth nightly as needed. Indications: Restless Legs Syndrome 60 Cap 4    metoprolol tartrate (LOPRESSOR) 25 mg tablet TAKE 1 & 1/2 TABLET BY MOUTH EVERY  Tab 2    omega-3 acid ethyl esters (LOVAZA) 1 gram capsule TAKES 2 PILLS TWICE A DAY. 120 Cap 2    TURMERIC ROOT EXTRACT PO Take  by mouth.  coenzyme q10-vitamin e 100-100 mg-unit cap Take  by mouth.  Cholecalciferol, Vitamin D3, (VITAMIN D3) 2,000 unit cap capsule Take  by mouth two (2) times a day.  estradiol (ESTRACE) 0.01 % (0.1 mg/gram) vaginal cream Insert 2 g into vagina daily.  fexofenadine (ALLEGRA) 180 mg tablet Take  by mouth. Allergies   Allergen Reactions    Codeine Nausea Only    Nitroglycerin Other (comments)    Red Yeast Rice Myalgia    Rozerem [Ramelteon] Other (comments)     Headache     Statins-Hmg-Coa Reductase Inhibitors Myalgia      Visit Vitals    /90 (BP 1 Location: Right arm, BP Patient Position: Sitting)    Pulse 75    Temp 97.9 °F (36.6 °C) (Oral)    Resp 15    Ht 5' 7.24\" (1.708 m)    Wt 177 lb (80.3 kg)    SpO2 98%    BMI 27.52 kg/m2       Physical Exam   Constitutional: She is oriented to person, place, and time. No distress. Cardiovascular: Normal rate and regular rhythm. Pulmonary/Chest: Breath sounds normal. No respiratory distress. She has no wheezes. She has no rales. Neurological: She is alert and oriented to person, place, and time. Psychiatric: She has a normal mood and affect. ASSESSMENT and PLAN  Vinh Wolff was seen today for hypertension. Diagnoses and all orders for this visit:    Restless legs syndrome (RLS)- manageable. Keep log of sx to find triggers.  Continue current gabapentin dose for now. Red flags to warrant ER or earlier clinical evaluation reviewed. See AVS for full details of plan and patient discussion. Polycystic kidney disease- Per Nephrology   -     lisinopril (PRINIVIL, ZESTRIL) 2.5 mg tablet; TAKE 1 TAB BY MOUTH DAILY. Essential hypertension  -     lisinopril (PRINIVIL, ZESTRIL) 2.5 mg tablet; TAKE 1 TAB BY MOUTH DAILY. Mixed hyperlipidemia- , . She has tried multiple lipid lowering agents including Red rice extract. She would be a candidate for PCKS9 inhibitor but she would like to try TLC before pursuing this. Advised her HLD is likely hereditary. Red flags to warrant ER or earlier clinical evaluation reviewed. See AVS for full details of plan and patient discussion.      -     LIPID PANEL      Follow-up Disposition:  Return in about 6 months (around 1/10/2018) for Medicare Wellness, Physical - 30 minute appointment. Medication risks/benefits/costs/interactions/alternatives discussed with patient. Bill Zhou  was given an after visit summary which includes diagnoses, current medications, & vitals. she expressed understanding with the diagnosis and plan.     20 minutes of 25 minutes of care coordination was spent counseling patient on treatment plan and assessing understanding

## 2017-07-10 NOTE — MR AVS SNAPSHOT
Visit Information Date & Time Provider Department Dept. Phone Encounter #  
 7/10/2017  8:45 AM Jose G Hassan MD [address] [address] Internal Medicine [phone] [id number] Follow-up Instructions Return in about 6 months (around 1/10/2018) for Medicare Wellness, Physical - 30 minute appointment. Your Appointments 8/14/2017 11:20 AM  
Follow Up with Jonel Ernst MD  
[address] at [facility] Appt Note: BILATERAL 6 month follow up Cp$ 0 4/17/17 TT  
 [address] [facility] [address]  
  
   
 [address] Upcoming Health Maintenance Date Due INFLUENZA AGE 9 TO ADULT 8/1/2017 GLAUCOMA SCREENING Q2Y 9/30/2017 MEDICARE YEARLY EXAM 1/10/2018 BREAST CANCER SCRN MAMMOGRAM 2/16/2018 Pneumococcal 65+ Low/Medium Risk (2 of 2 - PPSV23) 2/18/2018 COLONOSCOPY 2/28/2018 DTaP/Tdap/Td series (2 - Td) 3/7/2026 Allergies as of 7/10/2017  Review Complete On: 7/10/2017 By: Jose G Hassan MD  
  
 Severity Noted Reaction Type Reactions Codeine  09/10/2015    Nausea Only Nitroglycerin  09/10/2015    Other (comments) Red Yeast Rice  01/09/2017    Myalgia Rozerem [Ramelteon]  01/09/2017    Other (comments) Headache Statins-hmg-coa Reductase Inhibitors  01/19/2016    Myalgia Current Immunizations  Never Reviewed Name Date Tdap 3/7/2016 Not reviewed this visit You Were Diagnosed With   
  
 Codes Comments Restless legs syndrome (RLS)    -  Primary ICD-10-CM: G25.81 ICD-9-CM: 333.94 Polycystic kidney disease     ICD-10-CM: Q61.3 ICD-9-CM: 753.12 Essential hypertension     ICD-10-CM: I10 
ICD-9-CM: 401.9 Mixed hyperlipidemia     ICD-10-CM: E78.2 ICD-9-CM: 272.2 Vitals BP Pulse Temp Resp Height(growth percentile) Weight(growth percentile) 136/90 (BP 1 Location: Right arm, BP Patient Position: Sitting) 75 97.9 °F (36.6 °C) (Oral) 15 5' 7.24\" (1.708 m) 177 lb (80.3 kg) SpO2 BMI OB Status Smoking Status 98% 27.52 kg/m2 Hysterectomy Never Smoker Vitals History BMI and BSA Data Body Mass Index Body Surface Area  
 27.52 kg/m 2 1.95 m 2 Preferred Pharmacy Pharmacy Name Phone [facility], [address] [place] [address] Your Updated Medication List  
  
   
This list is accurate as of: 7/10/17  9:32 AM.  Always use your most recent med list.  
  
  
  
  
 ACIPHEX 20 mg tablet Generic drug:  RABEprazole TAKE 1 TAB BY MOUTH DAILY. ALLEGRA 180 mg tablet Generic drug:  fexofenadine Take  by mouth. Cholecalciferol (Vitamin D3) 2,000 unit Cap capsule Commonly known as:  VITAMIN D3 Take  by mouth two (2) times a day. coenzyme q10-vitamin e 100-100 mg-unit Cap Take  by mouth. ESTRACE 0.01 % (0.1 mg/gram) vaginal cream  
Generic drug:  estradiol Insert 2 g into vagina daily. gabapentin 100 mg capsule Commonly known as:  NEURONTIN Take 2 Caps by mouth nightly as needed. Indications: Restless Legs Syndrome  
  
 lisinopril 2.5 mg tablet Commonly known as:  PRINIVIL, ZESTRIL  
TAKE 1 TAB BY MOUTH DAILY. melatonin 10 mg Cap Take 1 Tab by mouth nightly. metoprolol tartrate 25 mg tablet Commonly known as:  LOPRESSOR  
TAKE 1 & 1/2 TABLET BY MOUTH EVERY DAY  
  
 * omega-3 acid ethyl esters 1 gram capsule Commonly known as:  Valdene Ran TAKES 2 PILLS TWICE A DAY. * omega-3 acid ethyl esters 1 gram capsule Commonly known as:  Valdene Ran Take 2 pills twice daily OTHER Take  by mouth daily. Indications: Flax TURMERIC ROOT EXTRACT PO Take  by mouth. TYLENOL EXTRA STRENGTH 500 mg tablet Generic drug:  acetaminophen Take 500 mg by mouth nightly. * Notice: This list has 2 medication(s) that are the same as other medications prescribed for you. Read the directions carefully, and ask your doctor or other care provider to review them with you. Prescriptions Sent to Pharmacy Refills  
 lisinopril (PRINIVIL, ZESTRIL) 2.5 mg tablet 3 Sig: TAKE 1 TAB BY MOUTH DAILY. Class: Normal  
 Pharmacy: [id number], [address] [place] [address] #: [phone] We Performed the Following LIPID PANEL [33283 CPT(R)] Follow-up Instructions Return in about 6 months (around 1/10/2018) for Medicare Wellness, Physical - 30 minute appointment. To-Do List   
 07/10/2017 12:30 PM  
  Appointment with Vijaya Rasmussen at [address] [facility] ([phone])  
  
 08/14/2017 10:00 AM  
  Appointment with [facility] at [address] [place] [address] ([phone]) Shower or bathe using soap and water. Do not use deodorant, powder, perfumes, or lotion. If your prior films were not performed at a local [facility] facility please bring or forward prior images 2 days before procedure. Patient Instructions It was a pleasure to see you! As discussed: 
 
Congratulations on your weight loss and positive lifestyle changes!!! 
 
I have ordered your age appropriate labs please complete them. You will need to fast 10-12hrs before your lab appointment. Complete labs two weeks before your next appointment in January. If no improvement in cholesterol I will have you see Cardiology to discuss a PCKS9 Inhibitor. Keep a log of your restless leg symptoms. If worsens we will repeat the imaging of your back Do not hesitate to contact the office if you have any questions or concerns before your next appointment. Restless Legs Syndrome: Care Instructions Your Care Instructions Restless legs syndrome is a common nervous system problem.  People with this syndrome feel a creeping, achy, or unpleasant feeling in the legs and an overpowering urge to move them. It often occurs in the evening and at night and can lead to sleep problems and tiredness. Your doctor may suggest doing a study of your sleep patterns to figure out what is happening when you try to sleep. Many people get relief from symptoms when they get regular exercise, eat well, and avoid caffeine, alcohol, and tobacco. 
Follow-up care is a key part of your treatment and safety. Be sure to make and go to all appointments, and call your doctor if you are having problems. It's also a good idea to know your test results and keep a list of the medicines you take. How can you care for yourself at home? · Take your medicines exactly as prescribed. Call your doctor if you think you are having a problem with your medicine. · Try bathing in hot or cold water. Applying a heating pad or ice bag to your legs may also help symptoms. · Stretch and massage your legs before bed or when discomfort begins. · Get some exercise for at least 30 minutes a day on most days of the week. Stop exercising at least 3 hours before bedtime. · Try to plan for situations where you will need to remain seated for long stretches. For example, if you are traveling by car, plan some stops so you can get out and walk around. · Tell your doctor about any medicines you are taking. This includes all over-the-counter, prescription, and herbal medicines. Some medicines, such as antidepressants, antihistamines, and cold and sinus medicines, can make your symptoms worse. · Avoid caffeine products, such as coffee, tea, cola, and chocolate. Caffeine can interrupt your sleep and stimulate you. · Do not smoke. Nicotine can make restless legs worse. If you need help quitting, talk to your doctor about stop-smoking programs and medicines. These can increase your chances of quitting for good. · Do not drink alcohol late in the evening. Take steps to help you sleep better · Get plenty of sunlight in the outdoors, particularly later in the afternoon. · Use the evening hours for settling down. Avoid activities that challenge you in the hours before bedtime. · Eat meals at regular times, and do not snack before bedtime. · Keep your bedroom quiet, dark, and cool. Try using a sleep mask and earplugs to help you sleep. · Limit how much you drink at night to reduce your need to get up to urinate. But do not go to bed thirsty. · Run a fan or other steady \"white noise\" during the night if noises wake you up. · Brent the bed for sleeping and sex. Do your reading or TV watching in another room. · Once you are in bed, relax from head to toe, and guide your mind to pleasant thoughts. · Do not stay in bed longer than 8 hours, and try to avoid naps. · If your symptoms usually improve around 4 a.m. to 6 a.m., try going to bed later than usual or allowing extra time for sleeping in to help you get the rest you need. When should you call for help? Watch closely for changes in your health, and be sure to contact your doctor if: 
· You are still not getting enough sleep. · Your symptoms become more severe or happen more often. Where can you learn more? Go to http://sergo-renny.info/. Enter X661 in the search box to learn more about \"Restless Legs Syndrome: Care Instructions. \" Current as of: October 14, 2016 Content Version: 11.3 © 4222-7920 [employer]. Care instructions adapted under license by [employer] (which disclaims liability or warranty for this information). If you have questions about a medical condition or this instruction, always ask your healthcare professional. [address] any warranty or liability for your use of this information. Introducing [facility]! Dear Coreen Neely: Thank you for requesting a [facility] account.   Our records indicate that you already have an active [employer] account. You can access your account anytime at [email or web address] Did you know that you can access your hospital and ER discharge instructions at any time in [employer]? You can also review all of your test results from your hospital stay or ER visit. Additional Information If you have questions, please visit the Frequently Asked Questions section of the [employer] website at [email or web address] Remember, S[employer]oo is NOT to be used for urgent needs. For medical emergencies, dial 911. Now available from your iPhone and Android! Please provide this summary of care documentation to your next provider. Your primary care clinician is listed as Colletta Stains. If you have any questions after today's visit, please call [phone].

## 2017-08-14 ENCOUNTER — OFFICE VISIT (OUTPATIENT)
Dept: SURGERY | Age: 72
End: 2017-08-14

## 2017-08-14 ENCOUNTER — HOSPITAL ENCOUNTER (OUTPATIENT)
Dept: MAMMOGRAPHY | Age: 72
Discharge: HOME | End: 2017-08-14
Attending: SURGERY
Payer: MEDICARE

## 2017-08-14 VITALS
BODY MASS INDEX: 27.78 KG/M2 | DIASTOLIC BLOOD PRESSURE: 55 MMHG | HEIGHT: 67 IN | WEIGHT: 177 LBS | SYSTOLIC BLOOD PRESSURE: 135 MMHG | HEART RATE: 68 BPM

## 2017-08-14 DIAGNOSIS — N60.02 BILATERAL BREAST CYSTS: Primary | ICD-10-CM

## 2017-08-14 DIAGNOSIS — N60.01 BILATERAL BREAST CYSTS: Primary | ICD-10-CM

## 2017-08-14 DIAGNOSIS — N60.11 CYST OF BREAST, BILATERAL, DIFFUSE FIBROCYSTIC: ICD-10-CM

## 2017-08-14 DIAGNOSIS — N60.12 CYST OF BREAST, BILATERAL, DIFFUSE FIBROCYSTIC: ICD-10-CM

## 2017-08-14 PROCEDURE — 77066 DX MAMMO INCL CAD BI: CPT

## 2017-08-14 PROCEDURE — 76642 ULTRASOUND BREAST LIMITED: CPT

## 2017-08-14 PROCEDURE — 76641 ULTRASOUND BREAST COMPLETE: CPT

## 2017-09-06 DIAGNOSIS — E78.5 HYPERLIPIDEMIA, UNSPECIFIED HYPERLIPIDEMIA TYPE: ICD-10-CM

## 2017-09-06 RX ORDER — OMEGA-3-ACID ETHYL ESTERS 1 G/1
CAPSULE, LIQUID FILLED ORAL
Qty: 120 CAP | Refills: 2 | Status: SHIPPED | OUTPATIENT
Start: 2017-09-06 | End: 2017-12-27 | Stop reason: SDUPTHER

## 2017-09-06 NOTE — TELEPHONE ENCOUNTER
From: Xiang Oliver  To: Lars Nicolas MD  Sent: 9/6/2017 8:39 AM EDT  Subject: Medication Renewal Request    Original authorizing provider: MD Xiang Vang would like a refill of the following medications:  omega-3 acid ethyl esters (LOVAZA) 1 gram capsule Lars Nicolas MD]    [facility]: [facility] [address]:

## 2017-10-08 DIAGNOSIS — G25.81 RLS (RESTLESS LEGS SYNDROME): ICD-10-CM

## 2017-10-09 RX ORDER — GABAPENTIN 100 MG/1
200 CAPSULE ORAL
Qty: 60 CAP | Refills: 4 | Status: SHIPPED | OUTPATIENT
Start: 2017-10-09 | End: 2018-01-18 | Stop reason: DRUGHIGH

## 2017-10-09 NOTE — TELEPHONE ENCOUNTER
From: Concepción Ibarra  To: Carla Lay MD  Sent: 10/8/2017 2:02 PM EDT  Subject: Medication Renewal Request    Original authorizing provider: MD Concepción Joshua would like a refill of the following medications:  gabapentin (NEURONTIN) 100 mg capsule Carla Lay MD]    Preferred pharmacy: [facility]:  Can you refill this for 3 months?

## 2017-10-25 RX ORDER — RAMELTEON 8 MG/1
8 TABLET ORAL
Qty: 30 TAB | Refills: 0 | OUTPATIENT
Start: 2017-10-25 | End: 2018-01-18

## 2017-11-10 DIAGNOSIS — G25.81 RLS (RESTLESS LEGS SYNDROME): ICD-10-CM

## 2017-11-10 RX ORDER — RABEPRAZOLE SODIUM 20 MG/1
TABLET, DELAYED RELEASE ORAL
Qty: 90 TAB | Refills: 1 | Status: SHIPPED | OUTPATIENT
Start: 2017-11-10 | End: 2018-01-18 | Stop reason: SDUPTHER

## 2017-12-27 DIAGNOSIS — E78.5 HYPERLIPIDEMIA, UNSPECIFIED HYPERLIPIDEMIA TYPE: ICD-10-CM

## 2017-12-28 RX ORDER — OMEGA-3-ACID ETHYL ESTERS 1 G/1
CAPSULE, LIQUID FILLED ORAL
Qty: 120 CAP | Refills: 2 | Status: SHIPPED | OUTPATIENT
Start: 2017-12-28 | End: 2018-04-06 | Stop reason: SDUPTHER

## 2018-01-11 ENCOUNTER — OFFICE VISIT (OUTPATIENT)
Dept: INTERNAL MEDICINE CLINIC | Age: 73
End: 2018-01-11

## 2018-01-11 VITALS
HEIGHT: 67 IN | WEIGHT: 169.2 LBS | BODY MASS INDEX: 26.56 KG/M2 | TEMPERATURE: 97.8 F | RESPIRATION RATE: 18 BRPM | DIASTOLIC BLOOD PRESSURE: 80 MMHG | SYSTOLIC BLOOD PRESSURE: 122 MMHG | HEART RATE: 72 BPM | OXYGEN SATURATION: 99 %

## 2018-01-11 DIAGNOSIS — Z82.49 FAMILY HISTORY OF EARLY CAD: ICD-10-CM

## 2018-01-11 DIAGNOSIS — E78.2 MIXED HYPERLIPIDEMIA: ICD-10-CM

## 2018-01-11 DIAGNOSIS — G25.81 RLS (RESTLESS LEGS SYNDROME): ICD-10-CM

## 2018-01-11 DIAGNOSIS — Q61.3 POLYCYSTIC KIDNEY DISEASE: ICD-10-CM

## 2018-01-11 DIAGNOSIS — I10 ESSENTIAL HYPERTENSION: ICD-10-CM

## 2018-01-11 DIAGNOSIS — Z00.00 MEDICARE ANNUAL WELLNESS VISIT, SUBSEQUENT: Primary | ICD-10-CM

## 2018-01-11 RX ORDER — ASPIRIN 81 MG/1
81 TABLET ORAL DAILY
Qty: 90 TAB | Refills: 1 | Status: SHIPPED | OUTPATIENT
Start: 2018-01-11 | End: 2018-07-10 | Stop reason: SDUPTHER

## 2018-01-11 NOTE — Clinical Note
Felipe Field,  I saw Brenda Nugent today and she asked for advance lipid profile to be ordered. Due to cost considerations and her medication intolerance is this going to ? I have sent her to cards for evaluation. Please let me know if the profile has dietary implications and I will order it. However it won't change our medical management. And medicare has been pushing back on it. Thanks!

## 2018-01-11 NOTE — PATIENT INSTRUCTIONS
It was a pleasure to see you! As discussed:    Congratulations on your weight loss and positive lifestyle changes!!!    I will obtain Dr. Ramon goodrich. I recommend:  -Start Aspirin 81mg daily.   -Schedule with Cardiology for evaluation and further heart disease assessment.   -Increase gabapentin to 300mg nightly          Well Visit, Over 65: Care Instructions  Your Care Instructions    Physical exams can help you stay healthy. Your doctor has checked your overall health and may have suggested ways to take good care of yourself. He or she also may have recommended tests. At home, you can help prevent illness with healthy eating, regular exercise, and other steps. Follow-up care is a key part of your treatment and safety. Be sure to make and go to all appointments, and call your doctor if you are having problems. It's also a good idea to know your test results and keep a list of the medicines you take. How can you care for yourself at home? · Reach and stay at a healthy weight. This will lower your risk for many problems, such as obesity, diabetes, heart disease, and high blood pressure. · Get at least 30 minutes of exercise on most days of the week. Walking is a good choice. You also may want to do other activities, such as running, swimming, cycling, or playing tennis or team sports. · Do not smoke. Smoking can make health problems worse. If you need help quitting, talk to your doctor about stop-smoking programs and medicines. These can increase your chances of quitting for good. · Protect your skin from too much sun. When you're outdoors from 10 a.m. to 4 p.m., stay in the shade or cover up with clothing and a hat with a wide brim. Wear sunglasses that block UV rays. Even when it's cloudy, put broad-spectrum sunscreen (SPF 30 or higher) on any exposed skin. · See a dentist one or two times a year for checkups and to have your teeth cleaned. · Wear a seat belt in the car.   · Limit alcohol to 2 drinks a day for men and 1 drink a day for women. Too much alcohol can cause health problems. Follow your doctor's advice about when to have certain tests. These tests can spot problems early. For men and women  · Cholesterol. Your doctor will tell you how often to have this done based on your overall health and other things that can increase your risk for heart attack and stroke. · Blood pressure. Have your blood pressure checked during a routine doctor visit. Your doctor will tell you how often to check your blood pressure based on your age, your blood pressure results, and other factors. · Diabetes. Ask your doctor whether you should have tests for diabetes. · Vision. Experts recommend that you have yearly exams for glaucoma and other age-related eye problems. · Hearing. Tell your doctor if you notice any change in your hearing. You can have tests to find out how well you hear. · Colon cancer tests. Keep having colon cancer tests as your doctor recommends. You can have one of several types of tests. · Heart attack and stroke risk. At least every 4 to 6 years, you should have your risk for heart attack and stroke assessed. Your doctor uses factors such as your age, blood pressure, cholesterol, and whether you smoke or have diabetes to show what your risk for a heart attack or stroke is over the next 10 years. · Osteoporosis. Talk to your doctor about whether you should have a bone density test to find out whether you have thinning bones. Also ask your doctor about whether you should take calcium and vitamin D supplements. For women  · Pap test and pelvic exam. You may no longer need a Pap test. Talk with your doctor about whether to stop or continue to have Pap tests. · Breast exam and mammogram. Ask how often you should have a mammogram, which is an X-ray of your breasts. A mammogram can spot breast cancer before it can be felt and when it is easiest to treat. · Thyroid disease.  Talk to your doctor about whether to have your thyroid checked as part of a regular physical exam. Women have an increased chance of a thyroid problem. For men  · Prostate exam. Talk to your doctor about whether you should have a blood test (called a PSA test) for prostate cancer. Experts disagree on whether men should have this test. Some experts recommend that you discuss the benefits and risks of the test with your doctor. · Abdominal aortic aneurysm. Ask your doctor whether you should have a test to check for an aneurysm. You may need a test if you ever smoked or if your parent, brother, sister, or child has had an aneurysm. When should you call for help? Watch closely for changes in your health, and be sure to contact your doctor if you have any problems or symptoms that concern you. Where can you learn more? Go to http://sergo-renny.info/. Enter Y985 in the search box to learn more about \"Well Visit, Over 65: Care Instructions. \"  Current as of: May 12, 2017  Content Version: 11.4  © 3085-5034 [employer]wise[employer]. Care instructions adapted under license by [employer] (which disclaims liability or warranty for this information). If you have questions about a medical condition or this instruction, always ask your healthcare professional. [place] [address] any warranty or liability for your use of this information.

## 2018-01-11 NOTE — PROGRESS NOTES
Chief Complaint   Patient presents with    Complete Physical     1. Have you been to the ER, urgent care clinic since your last visit? Hospitalized since your last visit? No    2. Have you seen or consulted any other health care providers outside of the [address] [place] [address] since your last visit? Include any pap smears or colon screening. Urologist Dr. Griffin Villegas, 11/2017.

## 2018-01-11 NOTE — PROGRESS NOTES
HISTORY OF PRESENT ILLNESS  Miles Lyons is a 74 y.o. female. HPI   Cardiovascular Review  The patient has hypertension and hyperlipidemia. Body mass index is 26.5 kg/(m^2). Diet and Lifestyle: generally follows a low fat low cholesterol diet, generally follows a low sodium diet, exercises regularly, nonsmoker  Home BP Monitoring: is well controlled at other visits   Pertinent ROS: taking medications as instructed, no medication side effects noted, no TIA's, no chest pain on exertion, no dyspnea on exertion, no swelling of ankles. PCKD  Followed by Nephrology. Records reviewed. This is a Subsequent Medicare Annual Wellness Exam (AWV) (Performed 12 months after IPPE or effective date of Medicare Part B enrollment)    I have reviewed the patient's medical history in detail and updated the computerized patient record. History     Past Medical History:   Diagnosis Date    Hypercholesterolemia     Hypertension     Joint pain     Melanoma ([address]     Polycystic kidney disease     Polycystic kidney disease     Restless leg syndrome       Past Surgical History:   Procedure Laterality Date    HX APPENDECTOMY      HX BUNIONECTOMY  2002    HX ORTHOPAEDIC      HX OTHER SURGICAL  1995    surgery for melanoma     HX OTHER SURGICAL Bilateral 2008 and 2014    aspiration of cysts BILATERAL kidneys    HX PARTIAL HYSTERECTOMY       Current Outpatient Prescriptions   Medication Sig Dispense Refill    aspirin delayed-release 81 mg tablet Take 1 Tab by mouth daily. 90 Tab 1    omega-3 acid ethyl esters (LOVAZA) 1 gram capsule take 2 capsules by mouth twice a day 120 Cap 2    ACIPHEX 20 mg tablet take 1 tablet by mouth once daily 90 Tab 1    ramelteon (ROZEREM) 8 mg tablet Take 1 Tab by mouth nightly as needed for Sleep. 30 Tab 0    gabapentin (NEURONTIN) 100 mg capsule Take 2 Caps by mouth nightly as needed.  Indications: Restless Legs Syndrome 60 Cap 4    lisinopril (PRINIVIL, ZESTRIL) 2.5 mg tablet TAKE 1 TAB BY MOUTH DAILY. 90 Tab 3    melatonin 10 mg cap Take 1 Tab by mouth nightly.  acetaminophen (TYLENOL EXTRA STRENGTH) 500 mg tablet Take 500 mg by mouth nightly.  OTHER Take  by mouth daily. Indications: Flax      ACIPHEX 20 mg tablet TAKE 1 TAB BY MOUTH DAILY. 90 Tab 1    metoprolol tartrate (LOPRESSOR) 25 mg tablet TAKE 1 & 1/2 TABLET BY MOUTH EVERY  Tab 2    omega-3 acid ethyl esters (LOVAZA) 1 gram capsule TAKES 2 PILLS TWICE A DAY. 120 Cap 2    TURMERIC ROOT EXTRACT PO Take  by mouth.  coenzyme q10-vitamin e 100-100 mg-unit cap Take  by mouth.  Cholecalciferol, Vitamin D3, (VITAMIN D3) 2,000 unit cap capsule Take  by mouth two (2) times a day.  estradiol (ESTRACE) 0.01 % (0.1 mg/gram) vaginal cream Insert 2 g into vagina daily.  fexofenadine (ALLEGRA) 180 mg tablet Take  by mouth.        Allergies   Allergen Reactions    Codeine Nausea Only    Nitroglycerin Other (comments)    Red Yeast Rice Myalgia    Rozerem [Ramelteon] Other (comments)     Headache     Statins-Hmg-Coa Reductase Inhibitors Myalgia     Family History   Problem Relation Age of Onset    Cancer Brother     Heart Disease Mother     Hypertension Mother     Breast Cancer Maternal Grandmother     Breast Cancer Other      Social History   Substance Use Topics    Smoking status: Never Smoker    Smokeless tobacco: Never Used    Alcohol use 0.6 oz/week     1 Standard drinks or equivalent per week     Patient Active Problem List   Diagnosis Code    GERD (gastroesophageal reflux disease) K21.9    HTN (hypertension) I10    HLD (hyperlipidemia) E78.5    Polycystic kidney disease Q61.3    PVC's (premature ventricular contractions) I49.3    RBBB I45.10    Bilateral breast cysts N60.01, N60.02    Spinal stenosis of lumbar region M48.061       Depression Risk Factor Screening:     PHQ over the last two weeks 1/11/2018   Little interest or pleasure in doing things Not at all   Feeling down, depressed or hopeless Not at all   Total Score PHQ 2 0     Alcohol Risk Factor Screening: You do not drink alcohol or very rarely. Functional Ability and Level of Safety:   Hearing Loss  Hearing is good. Activities of Daily Living  The home contains: no safety equipment. Patient does total self care    Fall Risk  Fall Risk Assessment, last 12 mths 1/11/2018   Able to walk? Yes   Fall in past 12 months? No   Fall with injury? -   Number of falls in past 12 months -   Fall Risk Score -       Abuse Screen  Patient is not abused    Cognitive Screening   Evaluation of Cognitive Function:  Has your family/caregiver stated any concerns about your memory: no  Normal    Patient Care Team   Patient Care Team:  Moses Butt MD as PCP - General (Internal Medicine)  Nima Cordon MD (Breast Surgery)  Nima Cordon MD (Breast Surgery)    Review of Systems   Constitutional: Negative for chills, fever and weight loss. HENT: Negative for congestion and sore throat. Eyes: Negative for blurred vision and double vision. Respiratory: Negative for cough and shortness of breath. Cardiovascular: Negative for chest pain, orthopnea and leg swelling. Gastrointestinal: Negative for abdominal pain, blood in stool, constipation, diarrhea, heartburn, nausea and vomiting. Genitourinary: Negative for frequency and urgency. Musculoskeletal: Negative for joint pain and myalgias. Neurological: Negative for dizziness, tremors, weakness and headaches. Endo/Heme/Allergies: Does not bruise/bleed easily. Psychiatric/Behavioral: Negative for depression and suicidal ideas. Physical Exam   Constitutional: She is oriented to person, place, and time. She appears well-developed and well-nourished. HENT:   Right Ear: External ear normal.   Left Ear: External ear normal.   Mouth/Throat: Oropharynx is clear and moist. No oropharyngeal exudate. Eyes: Conjunctivae are normal. No scleral icterus.    Neck: Normal range of motion. Neck supple. No thyromegaly present. Cardiovascular: Normal rate, regular rhythm and normal heart sounds. Exam reveals no gallop and no friction rub. No murmur heard. Pulmonary/Chest: Effort normal and breath sounds normal. No respiratory distress. She has no wheezes. She has no rales. She exhibits no tenderness. Abdominal: Soft. Bowel sounds are normal. She exhibits no distension. There is no tenderness. There is no rebound and no guarding. Genitourinary:   Genitourinary Comments: Deferred for gyn per pt request   Musculoskeletal: Normal range of motion. She exhibits no edema or tenderness. Neurological: She is alert and oriented to person, place, and time. Psychiatric: She has a normal mood and affect. ASSESSMENT and PLAN    Assessment/Plan   Education and counseling provided:  Are appropriate based on today's review and evaluation  End-of-Life planning (with patient's consent)    Diagnoses and all orders for this visit:    1. Medicare annual wellness visit, subsequent- Health Maintenance reviewed and addressed as ordered below     2. Essential hypertension- well controlled. Counseled on diet and exercise. Continue current regimen.     -     REFERRAL TO CARDIOLOGY    3. Mixed hyperlipidemia- at increased  for cardiac disease given her family history and elevated lipids that cannot be treated with statins or other lipid-lowering agents. She would benefit from seeing cardiology for additional risk stratification and possible intervention if needed. Fortunately she currently has no angina or cardiac symptoms. Red flags to warrant ER or earlier clinical evaluation reviewed. -     REFERRAL TO CARDIOLOGY  -     aspirin delayed-release 81 mg tablet; Take 1 Tab by mouth daily. 4. Polycystic kidney disease- per Nephrology. 5. RLS (restless legs syndrome)-increase gabapentin dose to 300 mg nightly.     6. Family history of early CADsee problem #3  -     REFERRAL TO CARDIOLOGY  -     aspirin delayed-release 81 mg tablet; Take 1 Tab by mouth daily.         Health Maintenance Due   Topic Date Due    COLONOSCOPY  02/28/2018

## 2018-01-16 DIAGNOSIS — Q61.3 POLYCYSTIC KIDNEY DISEASE: ICD-10-CM

## 2018-01-16 DIAGNOSIS — I10 ESSENTIAL HYPERTENSION: Primary | ICD-10-CM

## 2018-01-16 DIAGNOSIS — I10 ESSENTIAL HYPERTENSION: ICD-10-CM

## 2018-01-16 RX ORDER — LISINOPRIL 2.5 MG/1
TABLET ORAL
Qty: 90 TAB | Refills: 2 | Status: SHIPPED | OUTPATIENT
Start: 2018-01-16 | End: 2018-06-28 | Stop reason: SDUPTHER

## 2018-01-17 RX ORDER — METOPROLOL TARTRATE 25 MG/1
TABLET, FILM COATED ORAL
Qty: 135 TAB | Refills: 2 | Status: SHIPPED | OUTPATIENT
Start: 2018-01-17 | End: 2018-10-13 | Stop reason: SDUPTHER

## 2018-01-17 NOTE — TELEPHONE ENCOUNTER
From: Dontrell Saha  To: Mali Noel MD  Sent: 1/16/2018 10:09 PM EST  Subject: Medication Renewal Request    Original authorizing provider: MD Dontrell Mccarthy would like a refill of the following medications:  metoprolol tartrate (LOPRESSOR) 25 mg tablet Mali Noel MD]    [facility] [address]:

## 2018-01-18 ENCOUNTER — OFFICE VISIT (OUTPATIENT)
Dept: CARDIOLOGY CLINIC | Age: 73
End: 2018-01-18

## 2018-01-18 VITALS
WEIGHT: 171.4 LBS | RESPIRATION RATE: 18 BRPM | HEIGHT: 67 IN | DIASTOLIC BLOOD PRESSURE: 80 MMHG | BODY MASS INDEX: 26.9 KG/M2 | OXYGEN SATURATION: 98 % | HEART RATE: 80 BPM | SYSTOLIC BLOOD PRESSURE: 132 MMHG

## 2018-01-18 DIAGNOSIS — I49.3 PVC'S (PREMATURE VENTRICULAR CONTRACTIONS): ICD-10-CM

## 2018-01-18 DIAGNOSIS — E78.2 MIXED HYPERLIPIDEMIA: Primary | ICD-10-CM

## 2018-01-18 DIAGNOSIS — I10 ESSENTIAL HYPERTENSION: ICD-10-CM

## 2018-01-18 DIAGNOSIS — E78.5 HYPERLIPIDEMIA, UNSPECIFIED HYPERLIPIDEMIA TYPE: Primary | ICD-10-CM

## 2018-01-18 RX ORDER — EZETIMIBE 10 MG/1
10 TABLET ORAL DAILY
Qty: 30 TAB | Refills: 4 | Status: SHIPPED | OUTPATIENT
Start: 2018-01-18 | End: 2018-07-24 | Stop reason: SDUPTHER

## 2018-01-18 RX ORDER — LANOLIN ALCOHOL/MO/W.PET/CERES
1000 CREAM (GRAM) TOPICAL DAILY
COMMUNITY

## 2018-01-18 RX ORDER — EZETIMIBE 10 MG/1
10 TABLET ORAL DAILY
Qty: 30 TAB | Refills: 4 | Status: SHIPPED | OUTPATIENT
Start: 2018-01-18 | End: 2018-01-18 | Stop reason: SDUPTHER

## 2018-01-18 RX ORDER — GABAPENTIN 100 MG/1
300 CAPSULE ORAL
COMMUNITY
End: 2018-01-29 | Stop reason: SDUPTHER

## 2018-01-18 NOTE — MR AVS SNAPSHOT
[address] [facility] [address] [facility] 
[phone] Patient: Kyler Gerard MRN: [id number] TXD:[id number] Visit Information Date & Time Provider Department Dept. Phone Encounter #  
 1/18/2018  1:40 PM Ayla Ingram MD [facility] [place] [phone] [id number] Follow-up Instructions Return in about 4 weeks (around 2/15/2018). Your Appointments 7/12/2018 10:00 AM  
ROUTINE CARE with Jennifer Merrill MD  
[facility] [employer] [facility] Appt Note: 6m fuv  
 [address] [place] [address] [facility] [address]  
[address] [facility] Upcoming Health Maintenance Date Due COLONOSCOPY 2/28/2018 Pneumococcal 65+ Low/Medium Risk (2 of 2 - PPSV23) 2/18/2018 BREAST CANCER SCRN MAMMOGRAM 8/14/2018 MEDICARE YEARLY EXAM 1/12/2019 GLAUCOMA SCREENING Q2Y 9/18/2019 DTaP/Tdap/Td series (2 - Td) 3/7/2026 Allergies as of 1/18/2018  Review Complete On: 1/18/2018 By: Ayla Ingram MD  
  
 Severity Noted Reaction Type Reactions Codeine  09/10/2015    Nausea Only Nitroglycerin  09/10/2015    Other (comments) Red Yeast Rice  01/09/2017    Myalgia Rozerem [Ramelteon]  01/09/2017    Other (comments) Headache Statins-hmg-coa Reductase Inhibitors  01/19/2016    Myalgia Current Immunizations  Never Reviewed Name Date Tdap 3/7/2016 Not reviewed this visit You Were Diagnosed With   
  
 Codes Comments Mixed hyperlipidemia    -  Primary ICD-10-CM: B15.8 ICD-9-CM: 272.2 PVC's (premature ventricular contractions)     ICD-10-CM: I49.3 ICD-9-CM: 427.69 Essential hypertension     ICD-10-CM: I10 
ICD-9-CM: 401.9 Vitals  BP Pulse Resp Height(growth percentile) Weight(growth percentile) SpO2  
 132/80 (BP 1 Location: Left arm) 80 18 5' 7\" (1.702 m) 171 lb 6.4 oz (77.7 kg) 98% BMI OB Status Smoking Status 26.85 kg/m2 Hysterectomy Never Smoker Vitals History BMI and BSA Data Body Mass Index Body Surface Area  
 26.85 kg/m 2 1.92 m 2 Preferred Pharmacy Pharmacy Name Phone [address], [address] Your Updated Medication List  
  
   
This list is accurate as of: 1/18/18  4:08 PM.  Always use your most recent med list.  
  
  
  
  
 ACIPHEX 20 mg tablet Generic drug:  RABEprazole TAKE 1 TAB BY MOUTH DAILY. ALLEGRA 180 mg tablet Generic drug:  fexofenadine Take  by mouth as needed. aspirin delayed-release 81 mg tablet Take 1 Tab by mouth daily. Cholecalciferol (Vitamin D3) 2,000 unit Cap capsule Commonly known as:  VITAMIN D3 Take  by mouth daily. coenzyme q10-vitamin e 100-100 mg-unit Cap Take  by mouth daily. ESTRACE 0.01 % (0.1 mg/gram) vaginal cream  
Generic drug:  estradiol Use twice weekly. ezetimibe 10 mg tablet Commonly known as:  Media Men Take 1 Tab by mouth daily. gabapentin 100 mg capsule Commonly known as:  NEURONTIN Take 300 mg by mouth nightly. lisinopril 2.5 mg tablet Commonly known as:  PRINIVIL, ZESTRIL  
TAKE 1 TAB BY MOUTH DAILY. melatonin 10 mg Cap Take 1 Tab by mouth nightly. metoprolol tartrate 25 mg tablet Commonly known as:  LOPRESSOR Take 1 & 1/2 tablet by mouth every day  
  
 omega-3 acid ethyl esters 1 gram capsule Commonly known as:  LOVAZA  
take 2 capsules by mouth twice a day OTHER Take  by mouth daily. Indications: Flax PRESERVISION AREDS 2 PO Take  by mouth two (2) times a day. TURMERIC ROOT EXTRACT PO Take  by mouth two (2) times a day. TYLENOL EXTRA STRENGTH 500 mg tablet Generic drug:  acetaminophen Take 1,000 mg by mouth nightly. VITAMIN B-12 1,000 mcg tablet Generic drug:  cyanocobalamin Take 1,000 mcg by mouth daily. We Performed the Following AMB POC EKG ROUTINE W/ 12 LEADS, INTER & REP [48915 CPT(R)] Follow-up Instructions Return in about 4 weeks (around 2/15/2018). To-Do List   
 02/01/2018 9:15 AM  
  Appointment with [facility] at [address] [place] [address] ([phone]) Shower or bathe using soap and water. Do not use deodorant, powder, perfumes, or lotion the day of your exam.  If your prior mammograms were not performed at [place] [address] please bring films with you or forward prior images 2 days before your procedure. If patient is not a callback diagnostic, the patient must have an order/script from the physician for the diagnostic. Please bring it on the day of the mammogram or have it faxed to the department. [facility]  [phone] [facility] [phone] [facility]  [phone] [facility] [phone] [facility] [phone] [facility] [phone] [facility] [phone] [facility] [phone]  
  
 02/01/2018 10:00 AM  
  Appointment with [facility] at [address] [place] [address] ([phone]) Shower or bathe using soap and water. Do not use deodorant, powder, perfumes, or lotion. If your prior films were not performed at a local [employer] facility please bring or forward prior images 2 days before procedure. Introducing [facility] SERVICES! Dear Tommy Cloud: Thank you for requesting a [employer] account. Our records indicate that you already have an active [employer] account. You can access your account anytime at [email or web address] Did you know that you can access your hospital and ER discharge instructions at any time in [employer]? You can also review all of your test results from your hospital stay or ER visit. Additional Information If you have questions, please visit the Frequently Asked Questions section of the [employer] website at [email or web address] Remember, MyChart is NOT to be used for urgent needs.  For medical emergencies, dial 911. Now available from your iPhone and Android! Please provide this summary of care documentation to your next provider. Your primary care clinician is listed as Jennifer Merrill. If you have any questions after today's visit, please call [phone].

## 2018-01-18 NOTE — PROGRESS NOTES
HISTORY OF PRESENT ILLNESS  Dilma Freedman is a 74 y.o. female     SUMMARY:   Problem List  Date Reviewed: 1/17/2018          Codes Class Noted    Spinal stenosis of lumbar region ICD-10-CM: M48.061  ICD-9-CM: 724.02  7/10/2017        Bilateral breast cysts ICD-10-CM: N60.01, N60.02  ICD-9-CM: 610.0  4/17/2017        GERD (gastroesophageal reflux disease) ICD-10-CM: K21.9  ICD-9-CM: 530.81  9/10/2015        HTN (hypertension) ICD-10-CM: I10  ICD-9-CM: 401.9  9/10/2015        HLD (hyperlipidemia) ICD-10-CM: E78.5  ICD-9-CM: 272.4  9/10/2015        Polycystic kidney disease ICD-10-CM: Q61.3  ICD-9-CM: 753.12  9/10/2015    Overview Addendum 9/10/2015  3:13 PM by Fidelina Jean Baptiste MD     Followed by [facility]              PVC's (premature ventricular contractions) ICD-10-CM: I49.3  ICD-9-CM: 427.69  9/10/2015        RBBB ICD-10-CM: I45.10  ICD-9-CM: 426.4  9/10/2015              Current Outpatient Prescriptions on File Prior to Visit   Medication Sig    metoprolol tartrate (LOPRESSOR) 25 mg tablet Take 1 & 1/2 tablet by mouth every day    lisinopril (PRINIVIL, ZESTRIL) 2.5 mg tablet TAKE 1 TAB BY MOUTH DAILY.  aspirin delayed-release 81 mg tablet Take 1 Tab by mouth daily.  omega-3 acid ethyl esters (LOVAZA) 1 gram capsule take 2 capsules by mouth twice a day    melatonin 10 mg cap Take 1 Tab by mouth nightly.  acetaminophen (TYLENOL EXTRA STRENGTH) 500 mg tablet Take 1,000 mg by mouth nightly.  OTHER Take  by mouth daily. Indications: Flax    ACIPHEX 20 mg tablet TAKE 1 TAB BY MOUTH DAILY.  TURMERIC ROOT EXTRACT PO Take  by mouth two (2) times a day.  coenzyme q10-vitamin e 100-100 mg-unit cap Take  by mouth daily.  Cholecalciferol, Vitamin D3, (VITAMIN D3) 2,000 unit cap capsule Take  by mouth daily.  estradiol (ESTRACE) 0.01 % (0.1 mg/gram) vaginal cream Use twice weekly.  fexofenadine (ALLEGRA) 180 mg tablet Take  by mouth as needed.      No current facility-administered medications on file prior to visit. CARDIOLOGY STUDIES TO DATE:  none  Chief Complaint   Patient presents with    Hypertension     HPI :  Ms. Wendy Li is a 74year-old referred for Dr. Michi Mello for cardiac evaluation. She has hypertension and a family history of premature coronary disease and her father had an MI in his mid 45s. Last lipid profile in 01/2016 showed an HDL of 74 and LDL of 110. She has been intolerant of all statin drugs. There is no history of diabetes and she has never smoked. She does yoga and water aerobics several days per week and walks two miles on the other days without any symptoms suggestive of angina or heart failure. She has occasional palpitations and documented PVCs, which she has had for years. Her EKG showed sinus rhythm with a single PVC and no acute ST-T wave changes. She has occasional mild orthostatic type dizziness and musculoskeletal type leg pains when walking. She has diffuse arthritis and arthralgias and has always had trouble sleeping, though that has been a little better lately with the addition of Gabapentin. She has frequent heartburn well controlled with Aciphex. CARDIAC ROS:   negative for chest pain, dyspnea, syncope, orthopnea, paroxysmal nocturnal dyspnea, exertional chest pressure/discomfort, claudication, lower extremity edema    Family History   Problem Relation Age of Onset    Cancer Brother     Heart Disease Mother     Hypertension Mother     Breast Cancer Maternal Grandmother     Breast Cancer Other        Past Medical History:   Diagnosis Date    Hypercholesterolemia     Hypertension     Joint pain     Melanoma ([address]     Polycystic kidney disease     Polycystic kidney disease     Restless leg syndrome        GENERAL ROS:  A comprehensive review of systems was negative except for that written in the HPI.     Visit Vitals    /80 (BP 1 Location: Left arm)    Pulse 80    Resp 18    Ht 5' 7\" (1.702 m)    Wt 171 lb 6.4 oz (77.7 kg)    SpO2 98%    BMI 26.85 kg/m2       Wt Readings from Last 3 Encounters:   01/18/18 171 lb 6.4 oz (77.7 kg)   01/11/18 169 lb 3.2 oz (76.7 kg)   08/14/17 177 lb (80.3 kg)            BP Readings from Last 3 Encounters:   01/18/18 132/80   01/11/18 122/80   08/14/17 135/55       PHYSICAL EXAM  General appearance: alert, cooperative, no distress, appears stated age  Neurologic: Alert and oriented X 3  Neck: supple, symmetrical, trachea midline, no adenopathy, no carotid bruit and no JVD  Lungs: clear to auscultation bilaterally  Heart: regular rate and rhythm, S1, S2 normal, no murmur, click, rub or gallop  Abdomen: soft, non-tender. Bowel sounds normal. No masses,  no organomegaly  Extremities: extremities normal, atraumatic, no cyanosis or edema  Pulses: 2+ and symmetric    Lab Results   Component Value Date/Time    Cholesterol, total 211 01/08/2016 07:54 AM    HDL Cholesterol 74 01/08/2016 07:54 AM    LDL, calculated 110 01/08/2016 07:54 AM    Triglyceride 137 01/08/2016 07:54 AM     ASSESSMENT  Although Ms. Bhakti Raymond has several important cardiac risk factors, she is fairly active and exercises without any worrisome symptoms and she has an essentially normal EKG. Her HDL is 70, is actually protective, though ideally an LDL less than 100 would be good for her given her risk factors and she is willing to try Zetia 10 mg a day. I do not think that she would qualify for injectable lipid lowering. I have advised her to get a calcium score to see whether we need any further risk stratification with stress testing, at least on a periodic basis and we talked about symptoms that would prompt an urgent return visit here or a call to 911. current treatment plan is effective, no change in therapy  lab results and schedule of future lab studies reviewed with patient  reviewed diet, exercise and weight control    Encounter Diagnoses   Name Primary?     Mixed hyperlipidemia Yes    PVC's (premature ventricular contractions)     Essential hypertension      Orders Placed This Encounter    AMB POC EKG ROUTINE W/ 12 LEADS, INTER & REP    gabapentin (NEURONTIN) 100 mg capsule    cyanocobalamin (VITAMIN B-12) 1,000 mcg tablet    VIT C/E/ZN/COPPR/LUTEIN/ZEAXAN (PRESERVISION AREDS 2 PO)       Follow-up Disposition:  Return in about 4 weeks (around 2/15/2018).     Syl Melara MD  1/18/2018

## 2018-01-29 DIAGNOSIS — I49.3 PVC'S (PREMATURE VENTRICULAR CONTRACTIONS): ICD-10-CM

## 2018-01-29 DIAGNOSIS — I10 ESSENTIAL HYPERTENSION: ICD-10-CM

## 2018-01-29 DIAGNOSIS — G25.81 RESTLESS LEG SYNDROME: Primary | ICD-10-CM

## 2018-01-29 RX ORDER — GABAPENTIN 100 MG/1
300 CAPSULE ORAL
Qty: 90 CAP | Refills: 2 | Status: SHIPPED | OUTPATIENT
Start: 2018-01-29 | End: 2018-04-30 | Stop reason: SDUPTHER

## 2018-01-29 NOTE — PROGRESS NOTES
Dear Rob Subramanian,    It was a pleasure to meet you during our recent visit. Thank you for completing your DEXA scan. Your results show that you are osteopenic (low bone density but not osteoporosis level). To prevent the development of osteoporosis you should continue a calcium and vitamin D supplement. Be sure to engage in weight bearing exercise. More information on preventing osteoporosis is listed after your DEXA scan results. Please call if you have any questions [phone]  Or contact me through [facility] [address]. Preventing Osteoporosis: After Your Visit   Your Care Instructions   Osteoporosis means the bones are weak and thin enough that they can break easily. The older you are, the more likely you are to get osteoporosis. But with plenty of calcium, vitamin D, and exercise, you can help prevent osteoporosis. The preteen and teen years are a key time for bone building. With the help of calcium, vitamin D, and exercise in those early years and beyond, the bones reach their peak density and strength by age 27. After age 27, your bones naturally start to thin and weaken. The stronger your bones are at around age 27, the lower your risk for osteoporosis. But no matter what your age and risk are, your bones still need calcium, vitamin D, and exercise to stay strong. Also avoid smoking, and limit alcohol. Smoking and heavy alcohol use can make your bones thinner. Talk to your doctor about any special risks you might have, such as having a close relative with osteoporosis or taking a medicine that can weaken bones. Your doctor can tell you the best ways to protect your bones from thinning. Follow-up care is a key part of your treatment and safety. Be sure to make and go to all appointments, and call your doctor if you are having problems. It's also a good idea to know your test results and keep a list of the medicines you take. How can you care for yourself at home?    Get enough calcium and vitamin D. hx diverticulosis   has been constipated took laxatives  pain was worse last night had difficulty sleeping The [place] recommends adults younger than age 46 need 1,000 mg of calcium and 600 IU of vitamin D each day. Women ages 46 to 79 need 1,200 mg of calcium and 600 IU of vitamin D each day. Men ages 46 to 79 need 1,000 mg of calcium and 600 IU of vitamin D each day. Adults 71 and older need 1,200 mg of calcium and 800 IU of vitamin D each day. Eat foods rich in calcium, like yogurt, cheese, milk, and dark green vegetables. Eat foods rich in vitamin D, like eggs, fatty fish, cereal, and fortified milk. Get some sunshine. Your body uses sunshine to make its own vitamin D. The safest time to be out in the sun is before 10 a.m. or after 3 p.m. Avoid getting sunburned. Sunburn can increase your risk of skin cancer. Talk to your doctor about taking a calcium plus vitamin D supplement. Ask about what type of calcium is right for you, and how much to take at a time. Adults ages 23 to 48 should not get more than 2,500 mg of calcium and 4,000 IU of vitamin D each day, whether it is from supplements and/or food. Adults ages 46 and older should not get more than 2,000 mg of calcium and 4,000 IU of vitamin D each day from supplements and/or food. Get regular bone-building exercise. Weight-bearing and resistance exercises keep bones healthy by working the muscles and bones against gravity. Start out at an exercise level that feels right for you. Add a little at a time until you can do the following:   Do 30 minutes of weight-bearing exercise on most days of the week. Walking, jogging, stair climbing, and dancing are good choices. Do resistance exercises with weights or elastic bands 2 to 3 days a week. Limit alcohol. Drink no more than 1 alcohol drink a day if you are a woman. Drink no more than 2 alcohol drinks a day if you are a man. Do not smoke. Smoking can make bones thin faster. If you need help quitting, talk to your doctor about stop-smoking programs and medicines.  These can increase your chances of quitting for good. When should you call for help? Watch closely for changes in your health, and be sure to contact your doctor if:    You need help with a healthy eating plan. You need help with an exercise plan. Where can you learn more? Go to [employer]   Enter C027 in the search box to learn more about \"Preventing Osteoporosis: After Your Visit. \"   © 3585-1102 Healthwise, Incorporated. Care instructions adapted under license by [employer] (which disclaims liability or warranty for this information). This care instruction is for use with your licensed healthcare professional. If you have questions about a medical condition or this instruction, always ask your healthcare professional. [address] any warranty or liability for your use of this information.    Content Version: 31.8.941919; Current as of: August 6, 2013

## 2018-02-01 ENCOUNTER — HOSPITAL ENCOUNTER (OUTPATIENT)
Dept: MAMMOGRAPHY | Age: 73
Discharge: HOME | End: 2018-02-01
Attending: SURGERY
Payer: MEDICARE

## 2018-02-01 ENCOUNTER — HOSPITAL ENCOUNTER (OUTPATIENT)
Dept: MAMMOGRAPHY | Age: 73
Discharge: HOME | End: 2018-02-01
Payer: MEDICARE

## 2018-02-01 DIAGNOSIS — N63.10 MASSES OF BOTH BREASTS: ICD-10-CM

## 2018-02-01 DIAGNOSIS — N63.20 MASSES OF BOTH BREASTS: ICD-10-CM

## 2018-02-01 DIAGNOSIS — R92.8 ABNORMAL MAMMOGRAM: ICD-10-CM

## 2018-02-01 PROCEDURE — 77066 DX MAMMO INCL CAD BI: CPT

## 2018-04-06 DIAGNOSIS — E78.5 HYPERLIPIDEMIA, UNSPECIFIED HYPERLIPIDEMIA TYPE: ICD-10-CM

## 2018-04-09 RX ORDER — OMEGA-3-ACID ETHYL ESTERS 1 G/1
2 CAPSULE, LIQUID FILLED ORAL 2 TIMES DAILY WITH MEALS
Qty: 120 CAP | Refills: 2 | Status: SHIPPED | OUTPATIENT
Start: 2018-04-09 | End: 2018-07-19 | Stop reason: SDUPTHER

## 2018-04-09 RX ORDER — RABEPRAZOLE SODIUM 20 MG/1
20 TABLET, DELAYED RELEASE ORAL DAILY
Qty: 90 TAB | Refills: 1 | Status: SHIPPED | OUTPATIENT
Start: 2018-04-09 | End: 2018-05-10 | Stop reason: SDUPTHER

## 2018-04-28 DIAGNOSIS — G25.81 RESTLESS LEG SYNDROME: ICD-10-CM

## 2018-04-30 DIAGNOSIS — G25.81 RESTLESS LEG SYNDROME: ICD-10-CM

## 2018-05-01 RX ORDER — GABAPENTIN 100 MG/1
CAPSULE ORAL
Qty: 90 CAP | Refills: 2 | Status: SHIPPED | OUTPATIENT
Start: 2018-05-01 | End: 2019-04-23 | Stop reason: SDUPTHER

## 2018-05-02 RX ORDER — GABAPENTIN 100 MG/1
300 CAPSULE ORAL
Qty: 90 CAP | Refills: 2 | Status: SHIPPED | OUTPATIENT
Start: 2018-05-02 | End: 2018-07-26 | Stop reason: SDUPTHER

## 2018-05-11 RX ORDER — RABEPRAZOLE SODIUM 20 MG/1
20 TABLET, DELAYED RELEASE ORAL DAILY
Qty: 90 TAB | Refills: 0 | Status: SHIPPED | OUTPATIENT
Start: 2018-05-11 | End: 2018-07-11 | Stop reason: SDUPTHER

## 2018-06-28 DIAGNOSIS — Q61.3 POLYCYSTIC KIDNEY DISEASE: ICD-10-CM

## 2018-06-28 DIAGNOSIS — I10 ESSENTIAL HYPERTENSION: ICD-10-CM

## 2018-07-03 RX ORDER — LISINOPRIL 2.5 MG/1
TABLET ORAL
Qty: 90 TAB | Refills: 2 | Status: SHIPPED | OUTPATIENT
Start: 2018-07-03 | End: 2019-03-14

## 2018-07-10 DIAGNOSIS — E78.2 MIXED HYPERLIPIDEMIA: ICD-10-CM

## 2018-07-10 DIAGNOSIS — Z82.49 FAMILY HISTORY OF EARLY CAD: ICD-10-CM

## 2018-07-10 RX ORDER — RABEPRAZOLE SODIUM 20 MG/1
20 TABLET, DELAYED RELEASE ORAL DAILY
Qty: 90 TAB | Refills: 0 | Status: CANCELLED | OUTPATIENT
Start: 2018-07-10

## 2018-07-11 ENCOUNTER — OFFICE VISIT (OUTPATIENT)
Dept: INTERNAL MEDICINE CLINIC | Age: 73
End: 2018-07-11

## 2018-07-11 ENCOUNTER — TELEPHONE (OUTPATIENT)
Dept: INTERNAL MEDICINE CLINIC | Age: 73
End: 2018-07-11

## 2018-07-11 ENCOUNTER — HOSPITAL ENCOUNTER (OUTPATIENT)
Dept: LAB | Age: 73
Discharge: HOME | End: 2018-07-11
Payer: MEDICARE

## 2018-07-11 VITALS
BODY MASS INDEX: 27.34 KG/M2 | SYSTOLIC BLOOD PRESSURE: 138 MMHG | HEIGHT: 67 IN | OXYGEN SATURATION: 97 % | HEART RATE: 63 BPM | DIASTOLIC BLOOD PRESSURE: 70 MMHG | TEMPERATURE: 97.8 F | RESPIRATION RATE: 18 BRPM | WEIGHT: 174.2 LBS

## 2018-07-11 DIAGNOSIS — I73.9 CLAUDICATION (HCC): ICD-10-CM

## 2018-07-11 DIAGNOSIS — M54.41 CHRONIC BILATERAL LOW BACK PAIN WITH RIGHT-SIDED SCIATICA: ICD-10-CM

## 2018-07-11 DIAGNOSIS — E78.2 MIXED HYPERLIPIDEMIA: ICD-10-CM

## 2018-07-11 DIAGNOSIS — Q61.3 POLYCYSTIC KIDNEY DISEASE: ICD-10-CM

## 2018-07-11 DIAGNOSIS — G89.29 CHRONIC BILATERAL LOW BACK PAIN WITH RIGHT-SIDED SCIATICA: ICD-10-CM

## 2018-07-11 DIAGNOSIS — E55.9 VITAMIN D DEFICIENCY: ICD-10-CM

## 2018-07-11 DIAGNOSIS — I10 ESSENTIAL HYPERTENSION: Primary | ICD-10-CM

## 2018-07-11 DIAGNOSIS — K21.9 GASTROESOPHAGEAL REFLUX DISEASE WITHOUT ESOPHAGITIS: ICD-10-CM

## 2018-07-11 PROCEDURE — 83735 ASSAY OF MAGNESIUM: CPT

## 2018-07-11 PROCEDURE — 36415 COLL VENOUS BLD VENIPUNCTURE: CPT

## 2018-07-11 PROCEDURE — 85025 COMPLETE CBC W/AUTO DIFF WBC: CPT

## 2018-07-11 PROCEDURE — 84100 ASSAY OF PHOSPHORUS: CPT

## 2018-07-11 PROCEDURE — 80053 COMPREHEN METABOLIC PANEL: CPT

## 2018-07-11 PROCEDURE — 82540 ASSAY OF CREATINE: CPT

## 2018-07-11 PROCEDURE — 81001 URINALYSIS AUTO W/SCOPE: CPT

## 2018-07-11 PROCEDURE — 83970 ASSAY OF PARATHORMONE: CPT

## 2018-07-11 PROCEDURE — 80061 LIPID PANEL: CPT

## 2018-07-11 PROCEDURE — 82306 VITAMIN D 25 HYDROXY: CPT

## 2018-07-11 RX ORDER — RABEPRAZOLE SODIUM 20 MG/1
20 TABLET, DELAYED RELEASE ORAL DAILY
Qty: 90 TAB | Refills: 0 | Status: SHIPPED | OUTPATIENT
Start: 2018-07-11 | End: 2018-07-26 | Stop reason: SDUPTHER

## 2018-07-11 RX ORDER — ASPIRIN 81 MG/1
81 TABLET ORAL DAILY
Qty: 90 TAB | Refills: 1 | Status: SHIPPED | OUTPATIENT
Start: 2018-07-11 | End: 2019-01-10 | Stop reason: SDUPTHER

## 2018-07-11 NOTE — PROGRESS NOTES
HISTORY OF PRESENT ILLNESS 
Garcia Acosta is a 74 y.o. female. Back Pain This is a chronic problem. The current episode started more than 1 week ago. The problem has been rapidly worsening. The problem occurs constantly. The pain is present in the lumbar spine. The pain radiates to the right thigh. The pain is moderate. Exacerbated by: walking  The pain is worse during the night. Associated symptoms include tingling. Pertinent negatives include no fever, no numbness, no dysuria, no pelvic pain and no weakness. Associated symptoms comments: When walking legs \"ache\" Jesús Keel Treatments tried: rest and seeing chiropractor  The treatment provided moderate relief. Cardiovascular Review The patient has hypertension and hyperlipidemia. Body mass index is Body mass index is 27.28 kg/(m^2). has gained 5lbs Diet and Lifestyle: generally follows a low fat low cholesterol diet, generally follows a low sodium diet, exercises regularly, nonsmoker Home BP Monitoring: is well controlled at other visits Pertinent ROS: taking medications as instructed, no medication side effects noted, no TIA's, no chest pain on exertion, no dyspnea on exertion, no swelling of ankles.  
  
PCKD Followed by Nephrology. Records reviewed. Review of Systems Constitutional: Negative for fever. Genitourinary: Negative for dysuria and pelvic pain. Musculoskeletal: Positive for back pain. Neurological: Positive for tingling. Negative for weakness and numbness. Patient Active Problem List  
 Diagnosis Date Noted  Spinal stenosis of lumbar region 07/10/2017  Bilateral breast cysts 04/17/2017  GERD (gastroesophageal reflux disease) 09/10/2015  
 HTN (hypertension) 09/10/2015  HLD (hyperlipidemia) 09/10/2015  Polycystic kidney disease 09/10/2015  PVC's (premature ventricular contractions) 09/10/2015  RBBB 09/10/2015 Current Outpatient Prescriptions Medication Sig Dispense Refill  lisinopril (PRINIVIL, ZESTRIL) 2.5 mg tablet TAKE 1 TAB BY MOUTH DAILY. 90 Tab 2  
 RABEprazole (ACIPHEX) 20 mg tablet Take 1 Tab by mouth daily. 90 Tab 0  
 gabapentin (NEURONTIN) 100 mg capsule Take 3 Caps by mouth nightly. 90 Cap 2  
 gabapentin (NEURONTIN) 100 mg capsule take 3 capsules by mouth at bedtime 90 Cap 2  
 omega-3 acid ethyl esters (LOVAZA) 1 gram capsule Take 2 Caps by mouth two (2) times daily (with meals). 120 Cap 2  cyanocobalamin (VITAMIN B-12) 1,000 mcg tablet Take 1,000 mcg by mouth daily.  VIT C/E/ZN/COPPR/LUTEIN/ZEAXAN (PRESERVISION AREDS 2 PO) Take  by mouth two (2) times a day.  metoprolol tartrate (LOPRESSOR) 25 mg tablet Take 1 & 1/2 tablet by mouth every day 135 Tab 2  
 aspirin delayed-release 81 mg tablet Take 1 Tab by mouth daily. 90 Tab 1  
 melatonin 10 mg cap Take 1 Tab by mouth nightly.  acetaminophen (TYLENOL EXTRA STRENGTH) 500 mg tablet Take 1,000 mg by mouth nightly.  TURMERIC ROOT EXTRACT PO Take  by mouth two (2) times a day.  coenzyme q10-vitamin e 100-100 mg-unit cap Take  by mouth daily.  Cholecalciferol, Vitamin D3, (VITAMIN D3) 2,000 unit cap capsule Take  by mouth daily.  estradiol (ESTRACE) 0.01 % (0.1 mg/gram) vaginal cream Use twice weekly.  fexofenadine (ALLEGRA) 180 mg tablet Take  by mouth as needed.  ezetimibe (ZETIA) 10 mg tablet Take 1 Tab by mouth daily. 30 Tab 4  
 OTHER Take  by mouth daily. Indications: Flax Allergies Allergen Reactions  Codeine Nausea Only  Nitroglycerin Other (comments)  Red Yeast Rice Myalgia  Rozerem [Ramelteon] Other (comments) Headache  Statins-Hmg-Coa Reductase Inhibitors Myalgia Visit Vitals  /70 (BP 1 Location: Right arm, BP Patient Position: Sitting)  Pulse 63  Temp 97.8 °F (36.6 °C) (Oral)  Resp 18  Ht 5' 7\" (1.702 m)  Wt 174 lb 3.2 oz (79 kg)  SpO2 97%  BMI 27.28 kg/m2 Physical Exam  
Constitutional: She is oriented to person, place, and time. She appears well-developed. No distress. Cardiovascular: Normal rate, regular rhythm and normal heart sounds. Musculoskeletal: She exhibits no edema. Thoracic back: She exhibits no tenderness, no swelling, no pain and no spasm. Lumbar back: She exhibits decreased range of motion, tenderness and spasm. She exhibits no bony tenderness. Back: BLE: negative SLR Neurological: She is alert and oriented to person, place, and time. ASSESSMENT and PLAN Diagnoses and all orders for this visit: 1. Essential hypertension- well controlled. Counseled on diet and exercise. Continue current regimen. 2. Mixed hyperlipidemia- statin in tolerant.  in 2/2018. Continue TLC  
-     LIPID PANEL 3. Polycystic kidney disease- per Nephrology. Labs ordered. -     LIPID PANEL 
-     METABOLIC PANEL, COMPREHENSIVE 
-     CBC WITH AUTOMATED DIFF 
-     MAGNESIUM 
-     PHOSPHORUS 
-     PTH INTACT 
-     VITAMIN D, 25 HYDROXY 
-     PROT+CREATU (RANDOM) 
-     URINALYSIS W/MICROSCOPIC 4. Gastroesophageal reflux disease without esophagitis- per HPI  
-     RABEprazole (ACIPHEX) 20 mg tablet; Take 1 Tab by mouth daily. 5. Claudication ([address]- r/o PAD given s/s and risk factors. -     ANKLE BRACHIAL INDEX; Future 6. Chronic bilateral low back pain with right-sided sciatica- medical treatment limited. Acetominophen ineffective. Can try BC powder (ASA) occasionally but needs to see orthopedics. Warned of bleeding with miss use. -     REFERRAL TO ORTHOPEDICS 7. Vitamin D deficiency 
-     VITAMIN D, 25 HYDROXY Follow-up Disposition: 
Return in about 6 months (around 1/11/2019) for Medicare Wellness, Physical - 30 minute appointment. Medication risks/benefits/costs/interactions/alternatives discussed with patient. Concepción Ibarra  was given an after visit summary which includes diagnoses, current medications, & vitals.  
she expressed understanding with the diagnosis and plan.

## 2018-07-11 NOTE — TELEPHONE ENCOUNTER
Scheduled for an MIRTHA per drs notes for 07/17/18 at 2:30 pm at [facility]

## 2018-07-11 NOTE — PROGRESS NOTES
Chief Complaint   Patient presents with    Hypertension    Cholesterol Problem     1. Have you been to the ER, urgent care clinic since your last visit? Hospitalized since your last visit? No    2. Have you seen or consulted any other health care providers outside of the [address] [place] [address] since your last visit? Include any pap smears or colon screening.  Yes Where: Dr Godinez/Nephrologist Reason for visit: follow up

## 2018-07-12 LAB
25(OH)D3+25(OH)D2 SERPL-MCNC: 45.1 NG/ML (ref 30–100)
ALBUMIN SERPL-MCNC: 4.6 G/DL (ref 3.5–4.8)
ALBUMIN/GLOB SERPL: 1.8 {RATIO} (ref 1.2–2.2)
ALP SERPL-CCNC: 68 IU/L (ref 39–117)
ALT SERPL-CCNC: 5 IU/L (ref 0–32)
APPEARANCE UR: CLEAR
AST SERPL-CCNC: 17 IU/L (ref 0–40)
BACTERIA #/AREA URNS HPF: NORMAL /[HPF]
BASOPHILS # BLD AUTO: 0 X10E3/UL (ref 0–0.2)
BASOPHILS NFR BLD AUTO: 1 %
BILIRUB SERPL-MCNC: 0.4 MG/DL (ref 0–1.2)
BILIRUB UR QL STRIP: NEGATIVE
BUN SERPL-MCNC: 27 MG/DL (ref 8–27)
BUN/CREAT SERPL: 20 (ref 12–28)
CALCIUM SERPL-MCNC: 10.7 MG/DL (ref 8.7–10.3)
CASTS URNS QL MICRO: NORMAL /LPF
CHLORIDE SERPL-SCNC: 101 MMOL/L (ref 96–106)
CHOLEST SERPL-MCNC: 305 MG/DL (ref 100–199)
CO2 SERPL-SCNC: 27 MMOL/L (ref 20–29)
COLOR UR: YELLOW
CREAT SERPL-MCNC: 1.33 MG/DL (ref 0.57–1)
CREAT UR-MCNC: 61.9 MG/DL
EOSINOPHIL # BLD AUTO: 0.3 X10E3/UL (ref 0–0.4)
EOSINOPHIL NFR BLD AUTO: 5 %
EPI CELLS #/AREA URNS HPF: NORMAL /HPF
ERYTHROCYTE [DISTWIDTH] IN BLOOD BY AUTOMATED COUNT: 14.1 % (ref 12.3–15.4)
GLOBULIN SER CALC-MCNC: 2.6 G/DL (ref 1.5–4.5)
GLUCOSE SERPL-MCNC: 94 MG/DL (ref 65–99)
GLUCOSE UR QL: NEGATIVE
HCT VFR BLD AUTO: 40.6 % (ref 34–46.6)
HDLC SERPL-MCNC: 67 MG/DL
HGB BLD-MCNC: 12.7 G/DL (ref 11.1–15.9)
HGB UR QL STRIP: NEGATIVE
IMM GRANULOCYTES # BLD: 0 X10E3/UL (ref 0–0.1)
IMM GRANULOCYTES NFR BLD: 0 %
KETONES UR QL STRIP: NEGATIVE
LDLC SERPL CALC-MCNC: 202 MG/DL (ref 0–99)
LEUKOCYTE ESTERASE UR QL STRIP: NEGATIVE
LYMPHOCYTES # BLD AUTO: 1.9 X10E3/UL (ref 0.7–3.1)
LYMPHOCYTES NFR BLD AUTO: 41 %
MAGNESIUM SERPL-MCNC: 2.1 MG/DL (ref 1.6–2.3)
MCH RBC QN AUTO: 29.3 PG (ref 26.6–33)
MCHC RBC AUTO-ENTMCNC: 31.3 G/DL (ref 31.5–35.7)
MCV RBC AUTO: 94 FL (ref 79–97)
MICRO URNS: NORMAL
MICRO URNS: NORMAL
MONOCYTES # BLD AUTO: 0.3 X10E3/UL (ref 0.1–0.9)
MONOCYTES NFR BLD AUTO: 6 %
NEUTROPHILS # BLD AUTO: 2.1 X10E3/UL (ref 1.4–7)
NEUTROPHILS NFR BLD AUTO: 47 %
NITRITE UR QL STRIP: NEGATIVE
PH UR STRIP: 5 [PH] (ref 5–7.5)
PHOSPHATE SERPL-MCNC: 3.3 MG/DL (ref 2.5–4.5)
PLATELET # BLD AUTO: 252 X10E3/UL (ref 150–379)
POTASSIUM SERPL-SCNC: 4.8 MMOL/L (ref 3.5–5.2)
PROT SERPL-MCNC: 7.2 G/DL (ref 6–8.5)
PROT UR QL STRIP: NEGATIVE
PROT UR-MCNC: 8.6 MG/DL
PROT/CREAT UR: 139 MG/G CREAT (ref 0–200)
PTH-INTACT SERPL-MCNC: 75 PG/ML (ref 15–65)
RBC # BLD AUTO: 4.34 X10E6/UL (ref 3.77–5.28)
RBC #/AREA URNS HPF: NORMAL /HPF
SODIUM SERPL-SCNC: 139 MMOL/L (ref 134–144)
SP GR UR: 1.02 (ref 1–1.03)
TRIGL SERPL-MCNC: 178 MG/DL (ref 0–149)
UROBILINOGEN UR STRIP-MCNC: 0.2 MG/DL (ref 0.2–1)
VLDLC SERPL CALC-MCNC: 36 MG/DL (ref 5–40)
WBC # BLD AUTO: 4.6 X10E3/UL (ref 3.4–10.8)
WBC #/AREA URNS HPF: NORMAL /HPF

## 2018-07-17 ENCOUNTER — TELEPHONE (OUTPATIENT)
Dept: INTERNAL MEDICINE CLINIC | Age: 73
End: 2018-07-17

## 2018-07-17 ENCOUNTER — HOSPITAL ENCOUNTER (OUTPATIENT)
Dept: VASCULAR SURGERY | Age: 73
Discharge: HOME | End: 2018-07-17
Attending: INTERNAL MEDICINE
Payer: MEDICARE

## 2018-07-17 DIAGNOSIS — I73.9 CLAUDICATION (HCC): ICD-10-CM

## 2018-07-17 PROCEDURE — 93922 UPR/L XTREMITY ART 2 LEVELS: CPT

## 2018-07-17 NOTE — PROCEDURES
[facility]  *** FINAL REPORT ***    Name: Shantanu Whalen  MRN: [id number]    Outpatient  : 1945  HIS Order #: [id number]  [id number] [facility] Visit #: [id number]  Date: 2018    TYPE OF TEST: Peripheral Arterial Testing    REASON FOR TEST  Claudication    Right Leg  Doppler:  PVR:  Ankle/Brachial: 1.15    Left Leg  Doppler:  PVR:  Ankle/Brachial: 1.20    INTERPRETATION/FINDINGS  PROCEDURE:  Evaluation of lower extremity arteries with systolic blood   pressure measurement at the ankle and brachial level and calculation  of the ankle/brachial pressure index (MIRTHA). Unless otherwise  indicated, the exam also includes pulse volume recording (PVR)  plethysmography at the ankle level. FINDINGS:  MIRTHA is 1.15 on the right and 1.20 on the left. PVR  waveforms are normal at the right ankle and normal at the left ankle. IMPRESSION:  There is no evidence of hemodynamically significant lower   extremity arterial obstruction. ADDITIONAL COMMENTS    I have personally reviewed the data relevant to the interpretation of  this  study.     TECHNOLOGIST: Grayson Bergeron RVT  Signed: 2018 03:18 PM    PHYSICIAN: Carli David MD  Signed: 2018 11:57 AM

## 2018-07-17 NOTE — TELEPHONE ENCOUNTER
Kobe Hoskins (Self) [phone]     Pt has a vascular lab test scheduled this afternoon at 2:30 and she is not sure where she is suppose to go.

## 2018-07-18 NOTE — TELEPHONE ENCOUNTER
Patient has been informed per drs result notes and recommendations, patient has scheduled with Dr Demi Saenz for next week.

## 2018-07-19 DIAGNOSIS — E78.5 HYPERLIPIDEMIA, UNSPECIFIED HYPERLIPIDEMIA TYPE: ICD-10-CM

## 2018-07-19 RX ORDER — OMEGA-3-ACID ETHYL ESTERS 1 G/1
CAPSULE, LIQUID FILLED ORAL
Qty: 120 CAP | Refills: 2 | Status: SHIPPED | OUTPATIENT
Start: 2018-07-19 | End: 2018-11-02 | Stop reason: SDUPTHER

## 2018-07-24 DIAGNOSIS — E78.2 MIXED HYPERLIPIDEMIA: Primary | ICD-10-CM

## 2018-07-24 RX ORDER — EZETIMIBE 10 MG/1
10 TABLET ORAL DAILY
Qty: 30 TAB | Refills: 4 | Status: SHIPPED | OUTPATIENT
Start: 2018-07-24 | End: 2019-01-10 | Stop reason: SDUPTHER

## 2018-07-25 NOTE — PROGRESS NOTES
Informed 7/19/18 via phone  --  Results are normal.  No further testing for peripheral vascular disease is needed. She should see the orthopedist for evaluation of her back contributing to these leg pains as discussed.

## 2018-07-26 DIAGNOSIS — K21.9 GASTROESOPHAGEAL REFLUX DISEASE WITHOUT ESOPHAGITIS: ICD-10-CM

## 2018-07-26 DIAGNOSIS — G25.81 RESTLESS LEG SYNDROME: ICD-10-CM

## 2018-07-27 RX ORDER — GABAPENTIN 100 MG/1
300 CAPSULE ORAL
Qty: 90 CAP | Refills: 2 | Status: SHIPPED | OUTPATIENT
Start: 2018-07-27 | End: 2019-01-24 | Stop reason: SDUPTHER

## 2018-07-27 RX ORDER — RABEPRAZOLE SODIUM 20 MG/1
20 TABLET, DELAYED RELEASE ORAL DAILY
Qty: 90 TAB | Refills: 0 | Status: SHIPPED | OUTPATIENT
Start: 2018-07-27 | End: 2018-11-02 | Stop reason: SDUPTHER

## 2018-07-27 NOTE — TELEPHONE ENCOUNTER
From: Tania Hogue  To: Lew Solis MD  Sent: 7/26/2018 12:30 PM EDT  Subject: Medication Renewal Request    Original authorizing provider: MD Tania Valentin would like a refill of the following medications:  gabapentin (NEURONTIN) 100 mg capsule Lew Solis MD]  RABEprazole (ACIPHEX) 20 mg tablet Lew Solis MD]    Preferred pharmacy: [facility]:

## 2018-07-31 ENCOUNTER — HOSPITAL ENCOUNTER (OUTPATIENT)
Dept: MRI IMAGING | Age: 73
Discharge: HOME | End: 2018-07-31
Attending: ORTHOPAEDIC SURGERY
Payer: MEDICARE

## 2018-07-31 DIAGNOSIS — M51.37 DEGENERATION OF LUMBAR OR LUMBOSACRAL INTERVERTEBRAL DISC: ICD-10-CM

## 2018-07-31 PROCEDURE — 72148 MRI LUMBAR SPINE W/O DYE: CPT

## 2018-08-15 ENCOUNTER — DOCUMENTATION ONLY (OUTPATIENT)
Dept: INTERNAL MEDICINE CLINIC | Age: 73
End: 2018-08-15

## 2018-10-13 DIAGNOSIS — I10 ESSENTIAL HYPERTENSION: ICD-10-CM

## 2018-10-16 RX ORDER — METOPROLOL TARTRATE 25 MG/1
TABLET, FILM COATED ORAL
Qty: 135 TAB | Refills: 2 | Status: SHIPPED | OUTPATIENT
Start: 2018-10-16

## 2018-10-16 NOTE — TELEPHONE ENCOUNTER
From: Jayce Ellison  To: Jovita Garner MD  Sent: 10/13/2018 6:25 PM EDT  Subject: Medication Renewal Request    Original authorizing provider: MD Jayce Stone would like a refill of the following medications:  metoprolol tartrate (LOPRESSOR) 25 mg tablet Jovita Garner MD]    Preferred pharmacy: [facility]:

## 2018-11-02 DIAGNOSIS — E78.5 HYPERLIPIDEMIA, UNSPECIFIED HYPERLIPIDEMIA TYPE: ICD-10-CM

## 2018-11-02 DIAGNOSIS — K21.9 GASTROESOPHAGEAL REFLUX DISEASE WITHOUT ESOPHAGITIS: ICD-10-CM

## 2018-11-02 RX ORDER — OMEGA-3-ACID ETHYL ESTERS 1 G/1
CAPSULE, LIQUID FILLED ORAL
Qty: 120 CAP | Refills: 2 | Status: SHIPPED | OUTPATIENT
Start: 2018-11-02 | End: 2019-02-25 | Stop reason: SDUPTHER

## 2018-11-02 RX ORDER — RABEPRAZOLE SODIUM 20 MG/1
20 TABLET, DELAYED RELEASE ORAL DAILY
Qty: 90 TAB | Refills: 0 | Status: SHIPPED | OUTPATIENT
Start: 2018-11-02 | End: 2019-02-03 | Stop reason: SDUPTHER

## 2019-01-10 DIAGNOSIS — E78.2 MIXED HYPERLIPIDEMIA: ICD-10-CM

## 2019-01-10 DIAGNOSIS — Z82.49 FAMILY HISTORY OF EARLY CAD: ICD-10-CM

## 2019-01-11 RX ORDER — EZETIMIBE 10 MG/1
10 TABLET ORAL DAILY
Qty: 30 TAB | Refills: 4 | Status: SHIPPED | OUTPATIENT
Start: 2019-01-11 | End: 2019-06-18 | Stop reason: SDUPTHER

## 2019-01-11 RX ORDER — ASPIRIN 81 MG/1
81 TABLET ORAL DAILY
Qty: 90 TAB | Refills: 1 | Status: SHIPPED | OUTPATIENT
Start: 2019-01-11 | End: 2019-07-08 | Stop reason: SDUPTHER

## 2019-01-24 DIAGNOSIS — G25.81 RESTLESS LEG SYNDROME: ICD-10-CM

## 2019-01-25 RX ORDER — GABAPENTIN 100 MG/1
300 CAPSULE ORAL
Qty: 90 CAP | Refills: 2 | Status: SHIPPED | OUTPATIENT
Start: 2019-01-25 | End: 2019-03-05 | Stop reason: SDUPTHER

## 2019-02-03 DIAGNOSIS — K21.9 GASTROESOPHAGEAL REFLUX DISEASE WITHOUT ESOPHAGITIS: ICD-10-CM

## 2019-02-04 RX ORDER — RABEPRAZOLE SODIUM 20 MG/1
20 TABLET, DELAYED RELEASE ORAL DAILY
Qty: 90 TAB | Refills: 0 | Status: SHIPPED | OUTPATIENT
Start: 2019-02-04 | End: 2019-05-11 | Stop reason: SDUPTHER

## 2019-02-07 ENCOUNTER — HOSPITAL ENCOUNTER (OUTPATIENT)
Dept: MAMMOGRAPHY | Age: 74
Discharge: HOME | End: 2019-02-07
Attending: INTERNAL MEDICINE
Payer: MEDICARE

## 2019-02-07 DIAGNOSIS — Z12.31 VISIT FOR SCREENING MAMMOGRAM: ICD-10-CM

## 2019-02-07 PROCEDURE — 77067 SCR MAMMO BI INCL CAD: CPT

## 2019-02-25 DIAGNOSIS — E78.5 HYPERLIPIDEMIA, UNSPECIFIED HYPERLIPIDEMIA TYPE: ICD-10-CM

## 2019-02-25 RX ORDER — OMEGA-3-ACID ETHYL ESTERS 1 G/1
CAPSULE, LIQUID FILLED ORAL
Qty: 120 CAP | Refills: 2 | Status: SHIPPED | OUTPATIENT
Start: 2019-02-25 | End: 2021-04-12 | Stop reason: ALTCHOICE

## 2019-03-05 ENCOUNTER — OFFICE VISIT (OUTPATIENT)
Dept: INTERNAL MEDICINE CLINIC | Age: 74
End: 2019-03-05

## 2019-03-05 VITALS
SYSTOLIC BLOOD PRESSURE: 158 MMHG | TEMPERATURE: 97.5 F | RESPIRATION RATE: 16 BRPM | DIASTOLIC BLOOD PRESSURE: 90 MMHG | OXYGEN SATURATION: 96 % | BODY MASS INDEX: 28.69 KG/M2 | HEART RATE: 79 BPM | WEIGHT: 182.8 LBS | HEIGHT: 67 IN

## 2019-03-05 DIAGNOSIS — Q61.3 POLYCYSTIC KIDNEY DISEASE: ICD-10-CM

## 2019-03-05 DIAGNOSIS — R94.4 DECREASED GFR: ICD-10-CM

## 2019-03-05 DIAGNOSIS — R63.5 WEIGHT GAIN: ICD-10-CM

## 2019-03-05 DIAGNOSIS — I10 ESSENTIAL HYPERTENSION: Primary | ICD-10-CM

## 2019-03-05 DIAGNOSIS — E78.2 MIXED HYPERLIPIDEMIA: ICD-10-CM

## 2019-03-05 NOTE — PROGRESS NOTES
Identified pt with two pt identifiers(name and ). Reviewed record in preparation for visit and have obtained necessary documentation. All patient medications has been reviewed. Chief Complaint   Patient presents with    Cholesterol Problem    GERD     6 month follow up        Health Maintenance Due   Topic    Pneumococcal 65+ Low/Medium Risk (2 of 2 - PPSV23)    MEDICARE YEARLY EXAM        Vitals:    19 1315   BP: 158/90   Pulse: 79   Resp: 16   Temp: 97.5 °F (36.4 °C)   TempSrc: Oral   SpO2: 96%   Weight: 182 lb 12.8 oz (82.9 kg)   Height: 5' 7\" (1.702 m)   PainSc:   0 - No pain       Coordination of Care Questionnaire:  :   1) Have you been to an emergency room, urgent care, or hospitalized since your last visit?   no       2. Have seen or consulted any other health care provider since your last visit? NO    3) Do you have an Advanced Directive/ Living Will in place? YES  If yes, do we have a copy on file YES  If no, would you like information NO    Patient is accompanied by self I have received verbal consent from Dixie Vargas to discuss any/all medical information while they are present in the room.

## 2019-03-05 NOTE — PROGRESS NOTES
HISTORY OF PRESENT ILLNESS  Franklin Craig is a 74 y.o. female. HPI  Cardiovascular Review:  The patient has hypertension, hyperlipidemia and  CKD. Diet and Lifestyle: generally follows a low sodium diet, exercises sporadically, nonsmoker  Home BP Monitoring: is borderline controlled at home, ranging 140's/90's. Pertinent ROS: taking medications as instructed, no medication side effects noted, no TIA's, no chest pain on exertion, no dyspnea on exertion, no swelling of ankles. Review of Systems   Constitutional: Negative for diaphoresis, fever and weight loss. Eyes: Negative for blurred vision and pain. Respiratory: Negative for shortness of breath. Cardiovascular: Negative for chest pain, palpitations, orthopnea and leg swelling. Musculoskeletal: Positive for falls (6weeks ). Neurological: Negative for focal weakness and headaches. Psychiatric/Behavioral: Negative for depression. per HPI   Patient Active Problem List    Diagnosis Date Noted    Spinal stenosis of lumbar region 07/10/2017    Bilateral breast cysts 04/17/2017    GERD (gastroesophageal reflux disease) 09/10/2015    HTN (hypertension) 09/10/2015    HLD (hyperlipidemia) 09/10/2015    Polycystic kidney disease 09/10/2015    PVC's (premature ventricular contractions) 09/10/2015    RBBB 09/10/2015       Current Outpatient Medications   Medication Sig Dispense Refill    omega-3 acid ethyl esters (LOVAZA) 1 gram capsule take 2 capsules by mouth twice a day with meals 120 Cap 2    RABEprazole (ACIPHEX) 20 mg tablet Take 1 Tab by mouth daily. Brand Only 90 Tab 0    aspirin delayed-release 81 mg tablet Take 1 Tab by mouth daily. 90 Tab 1    ezetimibe (ZETIA) 10 mg tablet Take 1 Tab by mouth daily. 30 Tab 4    metoprolol tartrate (LOPRESSOR) 25 mg tablet Take 1 & 1/2 tablet by mouth every day 135 Tab 2    lisinopril (PRINIVIL, ZESTRIL) 2.5 mg tablet TAKE 1 TAB BY MOUTH DAILY.  90 Tab 2    gabapentin (NEURONTIN) 100 mg capsule take 3 capsules by mouth at bedtime 90 Cap 2    cyanocobalamin (VITAMIN B-12) 1,000 mcg tablet Take 1,000 mcg by mouth daily.  VIT C/E/ZN/COPPR/LUTEIN/ZEAXAN (PRESERVISION AREDS 2 PO) Take  by mouth two (2) times a day.  melatonin 10 mg cap Take 1 Tab by mouth nightly.  acetaminophen (TYLENOL EXTRA STRENGTH) 500 mg tablet Take 1,000 mg by mouth nightly.  TURMERIC ROOT EXTRACT PO Take  by mouth two (2) times a day.  coenzyme q10-vitamin e 100-100 mg-unit cap Take  by mouth daily.  estradiol (ESTRACE) 0.01 % (0.1 mg/gram) vaginal cream Use twice weekly.  fexofenadine (ALLEGRA) 180 mg tablet Take  by mouth as needed.  OTHER Take  by mouth daily. Indications: Flax      Cholecalciferol, Vitamin D3, (VITAMIN D3) 2,000 unit cap capsule Take  by mouth daily. Allergies   Allergen Reactions    Codeine Nausea Only    Nitroglycerin Other (comments)    Red Yeast Rice Myalgia    Rozerem [Ramelteon] Other (comments)     Headache     Statins-Hmg-Coa Reductase Inhibitors Myalgia      Visit Vitals  /90 (BP 1 Location: Left arm, BP Patient Position: Sitting)   Pulse 79   Temp 97.5 °F (36.4 °C) (Oral)   Resp 16   Ht 5' 7\" (1.702 m)   Wt 182 lb 12.8 oz (82.9 kg)   SpO2 96%   BMI 28.63 kg/m²       Physical Exam   Constitutional: She is oriented to person, place, and time. She appears well-developed. No distress. Eyes: Conjunctivae are normal.   Neck: Neck supple. Cardiovascular: Normal rate, regular rhythm and normal heart sounds. Pulmonary/Chest: Effort normal and breath sounds normal. No respiratory distress. She has no wheezes. She has no rales. She exhibits no tenderness. Musculoskeletal: She exhibits no edema. Left 5th finger   Neurological: She is alert and oriented to person, place, and time. Skin: Skin is warm. Psychiatric: She has a normal mood and affect. ASSESSMENT and PLAN  Diagnoses and all orders for this visit:    1.  Essential hypertension- increasing likely due to weight gain. Given her CKD will check GFR if worsening prior to increasing her Lisinopril. If GFR stable increase Lisinopril to 5mg.   -     METABOLIC PANEL, BASIC    2. Polycystic kidney disease- followed by nephrology. Advised to schedule an earlier appt   -     TSH 3RD GENERATION  -     T4, FREE  -     METABOLIC PANEL, BASIC    3. Mixed hyperlipidemia- check lipids  -     LIPID PANEL    4. Decreased GFR- see above. -     METABOLIC PANEL, BASIC    5. Weight gain- admits to dietary indiscretion. Decrease sugar resume nutritionist plan. -     TSH 3RD GENERATION  -     T4, FREE      Follow-up Disposition:  Return in about 4 months (around 7/5/2019) for Medicare Wellness. Medication risks/benefits/costs/interactions/alternatives discussed with patient. Helen Collins  was given an after visit summary which includes diagnoses, current medications, & vitals. she expressed understanding with the diagnosis and plan.

## 2019-03-06 ENCOUNTER — HOSPITAL ENCOUNTER (OUTPATIENT)
Dept: LAB | Age: 74
Discharge: HOME | End: 2019-03-06
Payer: MEDICARE

## 2019-03-06 PROCEDURE — 36415 COLL VENOUS BLD VENIPUNCTURE: CPT

## 2019-03-06 PROCEDURE — 80061 LIPID PANEL: CPT

## 2019-03-06 PROCEDURE — 84443 ASSAY THYROID STIM HORMONE: CPT

## 2019-03-06 PROCEDURE — 80048 BASIC METABOLIC PNL TOTAL CA: CPT

## 2019-03-07 LAB
BUN SERPL-MCNC: 23 MG/DL (ref 8–27)
BUN/CREAT SERPL: 20 (ref 12–28)
CALCIUM SERPL-MCNC: 10.7 MG/DL (ref 8.7–10.3)
CHLORIDE SERPL-SCNC: 103 MMOL/L (ref 96–106)
CHOLEST SERPL-MCNC: 242 MG/DL (ref 100–199)
CO2 SERPL-SCNC: 26 MMOL/L (ref 20–29)
CREAT SERPL-MCNC: 1.16 MG/DL (ref 0.57–1)
GLUCOSE SERPL-MCNC: 86 MG/DL (ref 65–99)
HDLC SERPL-MCNC: 66 MG/DL
LDLC SERPL CALC-MCNC: 141 MG/DL (ref 0–99)
POTASSIUM SERPL-SCNC: 4.9 MMOL/L (ref 3.5–5.2)
SODIUM SERPL-SCNC: 142 MMOL/L (ref 134–144)
T4 FREE SERPL-MCNC: 0.97 NG/DL (ref 0.82–1.77)
TRIGL SERPL-MCNC: 176 MG/DL (ref 0–149)
TSH SERPL DL<=0.005 MIU/L-ACNC: 2.57 UIU/ML (ref 0.45–4.5)
VLDLC SERPL CALC-MCNC: 35 MG/DL (ref 5–40)

## 2019-03-14 ENCOUNTER — OFFICE VISIT (OUTPATIENT)
Dept: CARDIOLOGY CLINIC | Age: 74
End: 2019-03-14

## 2019-03-14 VITALS
WEIGHT: 180.6 LBS | BODY MASS INDEX: 28.35 KG/M2 | DIASTOLIC BLOOD PRESSURE: 80 MMHG | OXYGEN SATURATION: 98 % | RESPIRATION RATE: 17 BRPM | HEIGHT: 67 IN | SYSTOLIC BLOOD PRESSURE: 140 MMHG | HEART RATE: 59 BPM

## 2019-03-14 DIAGNOSIS — Q61.3 POLYCYSTIC KIDNEY DISEASE: ICD-10-CM

## 2019-03-14 DIAGNOSIS — E78.2 MIXED HYPERLIPIDEMIA: ICD-10-CM

## 2019-03-14 DIAGNOSIS — I49.3 PVC'S (PREMATURE VENTRICULAR CONTRACTIONS): ICD-10-CM

## 2019-03-14 DIAGNOSIS — I10 ESSENTIAL HYPERTENSION: Primary | ICD-10-CM

## 2019-03-14 RX ORDER — LISINOPRIL 5 MG/1
TABLET ORAL
Qty: 90 TAB | Refills: 3 | Status: SHIPPED | OUTPATIENT
Start: 2019-03-14

## 2019-03-14 NOTE — PROGRESS NOTES
HISTORY OF PRESENT ILLNESS  Keshia Vargas is a 74 y.o. female     SUMMARY:   Problem List  Date Reviewed: 3/13/2019          Codes Class Noted    Spinal stenosis of lumbar region ICD-10-CM: M48.061  ICD-9-CM: 724.02  7/10/2017        Bilateral breast cysts ICD-10-CM: N60.01, N60.02  ICD-9-CM: 610.0  4/17/2017        GERD (gastroesophageal reflux disease) ICD-10-CM: K21.9  ICD-9-CM: 530.81  9/10/2015        HTN (hypertension) ICD-10-CM: I10  ICD-9-CM: 401.9  9/10/2015        HLD (hyperlipidemia) ICD-10-CM: E78.5  ICD-9-CM: 272.4  9/10/2015        Polycystic kidney disease ICD-10-CM: Q61.3  ICD-9-CM: 753.12  9/10/2015    Overview Addendum 9/10/2015  3:13 PM by Chidi Felix MD     Followed by [address] [place] [address] Urology              PVC's (premature ventricular contractions) ICD-10-CM: I49.3  ICD-9-CM: 427.69  9/10/2015        RBBB ICD-10-CM: I45.10  ICD-9-CM: 426.4  9/10/2015              Current Outpatient Medications on File Prior to Visit   Medication Sig    omega-3 acid ethyl esters (LOVAZA) 1 gram capsule take 2 capsules by mouth twice a day with meals    RABEprazole (ACIPHEX) 20 mg tablet Take 1 Tab by mouth daily. Brand Only    aspirin delayed-release 81 mg tablet Take 1 Tab by mouth daily.  ezetimibe (ZETIA) 10 mg tablet Take 1 Tab by mouth daily.  metoprolol tartrate (LOPRESSOR) 25 mg tablet Take 1 & 1/2 tablet by mouth every day    gabapentin (NEURONTIN) 100 mg capsule take 3 capsules by mouth at bedtime    cyanocobalamin (VITAMIN B-12) 1,000 mcg tablet Take 1,000 mcg by mouth daily.  VIT C/E/ZN/COPPR/LUTEIN/ZEAXAN (PRESERVISION AREDS 2 PO) Take  by mouth two (2) times a day.  melatonin 10 mg cap Take 1 Tab by mouth nightly.  acetaminophen (TYLENOL EXTRA STRENGTH) 500 mg tablet Take 1,000 mg by mouth nightly.  OTHER Take  by mouth daily. Indications: Flax    TURMERIC ROOT EXTRACT PO Take  by mouth two (2) times a day.     coenzyme q10-vitamin e 100-100 mg-unit cap Take  by mouth daily.  Cholecalciferol, Vitamin D3, (VITAMIN D3) 2,000 unit cap capsule Take  by mouth daily.  estradiol (ESTRACE) 0.01 % (0.1 mg/gram) vaginal cream Use twice weekly.  fexofenadine (ALLEGRA) 180 mg tablet Take  by mouth as needed. No current facility-administered medications on file prior to visit. CARDIOLOGY STUDIES TO DATE:  No specialty comments available. Chief Complaint   Patient presents with    Hypertension     HPI :  Ms. Sophie Villa comes in many because of concerns about her blood pressure, which has been running a little high at home and in the doctor's office recently. She has not been exercising quite as much as she would like because she has not been able to do water aerobics for a couple of months after breaking her finger. She does still walk some and do the stationary bike and yoga. She was off Zetia for a while, but now back on it and her LDL is down to about 140. She has very rare PVCs, palpitations, nothing that is worrying her. CARDIAC ROS:   negative for chest pain, dyspnea, syncope, orthopnea, paroxysmal nocturnal dyspnea, exertional chest pressure/discomfort, claudication, lower extremity edema    Family History   Problem Relation Age of Onset    Cancer Brother     Heart Disease Mother     Hypertension Mother     Breast Cancer Maternal Grandmother         age unknown    Breast Cancer Other         age unknown       Past Medical History:   Diagnosis Date    Hypercholesterolemia     Hypertension     Joint pain     Melanoma ([facility] [address]     Menopause     LMP-early 46s?  Polycystic kidney disease     Polycystic kidney disease     Restless leg syndrome        GENERAL ROS:  A comprehensive review of systems was negative except for that written in the HPI.     Visit Vitals  /80 (BP 1 Location: Right arm, BP Patient Position: Sitting)   Pulse (!) 59   Resp 17   Ht 5' 7\" (1.702 m)   Wt 180 lb 9.6 oz (81.9 kg)   SpO2 98%   BMI 28.29 kg/m²       Wt Readings from Last 3 Encounters:   03/14/19 180 lb 9.6 oz (81.9 kg)   03/05/19 182 lb 12.8 oz (82.9 kg)   07/11/18 174 lb 3.2 oz (79 kg)            BP Readings from Last 3 Encounters:   03/14/19 140/80   03/05/19 158/90   07/11/18 138/70       PHYSICAL EXAM  General appearance: alert, cooperative, no distress, appears stated age  Neurologic: Alert and oriented X 3  Neck: supple, symmetrical, trachea midline, no adenopathy, no carotid bruit and no JVD  Lungs: clear to auscultation bilaterally  Heart: regular rate and rhythm, S1, S2 normal, no murmur, click, rub or gallop  Extremities: extremities normal, atraumatic, no cyanosis or edema    Lab Results   Component Value Date/Time    Cholesterol, total 242 (H) 03/06/2019 12:55 PM    Cholesterol, total 305 (H) 07/11/2018 09:24 AM    Cholesterol, total 211 (H) 01/08/2016 07:54 AM    HDL Cholesterol 66 03/06/2019 12:55 PM    HDL Cholesterol 67 07/11/2018 09:24 AM    HDL Cholesterol 74 01/08/2016 07:54 AM    LDL, calculated 141 (H) 03/06/2019 12:55 PM    LDL, calculated 202 (H) 07/11/2018 09:24 AM    LDL, calculated 110 (H) 01/08/2016 07:54 AM    Triglyceride 176 (H) 03/06/2019 12:55 PM    Triglyceride 178 (H) 07/11/2018 09:24 AM    Triglyceride 137 01/08/2016 07:54 AM     ASSESSMENT  Ms. Luc Betancur is stable from a cardiac perspective with no worrisome symptoms. I see no reason not to increase her Lisinopril to 5 mg a day. We talked about potential kidney and potassium issues with this change, so she has elected to make an early appointment with Dr. Daniel Hood, who is her nephrologist to check in with him. We again talked about a calcium score and she does not want to proceed with that at the present time. current treatment plan is effective, no change in therapy  lab results and schedule of future lab studies reviewed with patient  reviewed diet, exercise and weight control    Encounter Diagnoses   Name Primary?     Essential hypertension Yes    Mixed hyperlipidemia     Polycystic kidney disease     PVC's (premature ventricular contractions)      Orders Placed This Encounter    lisinopril (PRINIVIL, ZESTRIL) 5 mg tablet       Follow-up Disposition:  Return in about 1 year (around 3/14/2020), or if symptoms worsen or fail to improve.     Jack Rivera MD  3/14/2019

## 2019-03-14 NOTE — PROGRESS NOTES
1. Have you been to the ER, urgent care clinic since your last visit? Hospitalized since your last visit? Yes When: 1/22 Reason for visit: Broken finger    2. Have you seen or consulted any other health care providers outside of the [address] [place] [address] since your last visit? Include any pap smears or colon screening. No    3) Do you have an Advance Directive on file? YES    Patient is accompanied by self I have received verbal consent from Kayley Aleman to discuss any/all medical information while they are present in the room.

## 2019-03-15 NOTE — PROGRESS NOTES
Dear Helen Collins   Thank you for completing your labs. Please find your most recent results below. Your results are clinically normal/ stable. Your cholesterol has improved. Continue Zetia. No medication changes are needed. Lab Review  Some labs that may have been tested and their explanation are:  Your electrolytes, kidney & liver function (Metabolic Panel)   Anemia, blood cells (CBC)  Thyroid (TSH + T4, T3)  Hormones (prolactin, vitamin D )   Pregnancy (Beta HCG)    Diabetes (Hemoglobin A1c)   PSA (Prostate Health)  Lipid Panel (Cholesterol, HDL \"good\", LDL \"bad\")     Do not hesitate to contact the office if you have any questions or concerns before your next appointment.      Kind regards,   Dr. Geovani Hooks         ----  Olesya Quan MD  Internal Medicine/ [facility] [address] Internal Medicine   [place] [address], [address] [address]  Office: [phone]  Fax: [phone]

## 2019-03-25 ENCOUNTER — TELEPHONE (OUTPATIENT)
Dept: INTERNAL MEDICINE CLINIC | Age: 74
End: 2019-03-25

## 2019-03-25 NOTE — TELEPHONE ENCOUNTER
Wife states was in office recently and Dr Valerie Almeida agreed to send in Diclofenac gel for  Jeremy Coffey. Originally prescribed by a provider on [facility], orthopedic. Patient using for hip pain.  Advise will forward request.

## 2019-04-21 DIAGNOSIS — G25.81 RESTLESS LEG SYNDROME: ICD-10-CM

## 2019-04-23 RX ORDER — GABAPENTIN 100 MG/1
CAPSULE ORAL
Qty: 90 CAP | Refills: 2 | Status: SHIPPED | OUTPATIENT
Start: 2019-04-23 | End: 2019-07-22 | Stop reason: SDUPTHER

## 2019-05-06 ENCOUNTER — TELEPHONE (OUTPATIENT)
Dept: INTERNAL MEDICINE CLINIC | Age: 74
End: 2019-05-06

## 2019-05-06 NOTE — TELEPHONE ENCOUNTER
Left Message for a return phone call.  She should see the [facility] providers or Chhaya Ann MD   Address: [id number] [place] [address], [address], [address] [place] [address]   Phone: [phone]

## 2019-05-06 NOTE — TELEPHONE ENCOUNTER
Pt wants to see if Dr. Tejinder Ramires will take her as a new patient, Dr. Danny Hernandez former. NOTE:  pt prefers female doctor so I advised patient of other practices taking new patients b/c Dr. Tejinder Ramires is the only one we have and is a MALE [employer] told her this is not guaranteed that he will accept her as a NP; Please advise patient if you want to add her; she was given # [facility].  She can be reached @ [phone]

## 2019-05-11 DIAGNOSIS — K21.9 GASTROESOPHAGEAL REFLUX DISEASE WITHOUT ESOPHAGITIS: ICD-10-CM

## 2019-05-14 RX ORDER — RABEPRAZOLE SODIUM 20 MG/1
20 TABLET, DELAYED RELEASE ORAL DAILY
Qty: 90 TAB | Refills: 0 | Status: SHIPPED | OUTPATIENT
Start: 2019-05-14

## 2019-07-08 DIAGNOSIS — E78.2 MIXED HYPERLIPIDEMIA: ICD-10-CM

## 2019-07-08 DIAGNOSIS — Z82.49 FAMILY HISTORY OF EARLY CAD: ICD-10-CM

## 2019-07-09 DIAGNOSIS — I10 ESSENTIAL HYPERTENSION: ICD-10-CM

## 2019-07-09 RX ORDER — ASPIRIN 81 MG/1
81 TABLET ORAL DAILY
Qty: 90 TAB | Refills: 0 | Status: SHIPPED | OUTPATIENT
Start: 2019-07-09 | End: 2020-03-16

## 2019-07-09 RX ORDER — METOPROLOL TARTRATE 25 MG/1
TABLET, FILM COATED ORAL
Qty: 135 TAB | Refills: 0 | OUTPATIENT
Start: 2019-07-09

## 2019-07-09 NOTE — TELEPHONE ENCOUNTER
Patient confirmed has established with new PCP. Not sure why pharmacy sent request. Pharmacy is aware of new PCP. Patient will reach out to pharmacy.

## 2019-07-10 DIAGNOSIS — E78.5 HYPERLIPIDEMIA, UNSPECIFIED HYPERLIPIDEMIA TYPE: ICD-10-CM

## 2019-07-10 RX ORDER — OMEGA-3-ACID ETHYL ESTERS 1 G/1
CAPSULE, LIQUID FILLED ORAL
Qty: 120 CAP | Refills: 0 | OUTPATIENT
Start: 2019-07-10

## 2019-07-22 DIAGNOSIS — G25.81 RESTLESS LEG SYNDROME: ICD-10-CM

## 2019-07-26 RX ORDER — GABAPENTIN 100 MG/1
CAPSULE ORAL
Qty: 90 CAP | Refills: 0 | OUTPATIENT
Start: 2019-07-26

## 2019-07-26 NOTE — TELEPHONE ENCOUNTER
Please call in. Notify her we will not be able to fill past this, needs to establish w/ new PCP.   Previous Dr Edwin Eric patient

## 2020-02-17 ENCOUNTER — HOSPITAL ENCOUNTER (OUTPATIENT)
Dept: MAMMOGRAPHY | Age: 75
Discharge: HOME | End: 2020-02-17
Attending: FAMILY MEDICINE
Payer: MEDICARE

## 2020-02-17 DIAGNOSIS — Z12.31 VISIT FOR SCREENING MAMMOGRAM: ICD-10-CM

## 2020-02-17 PROCEDURE — 77063 BREAST TOMOSYNTHESIS BI: CPT

## 2020-03-16 ENCOUNTER — OFFICE VISIT (OUTPATIENT)
Dept: CARDIOLOGY CLINIC | Age: 75
End: 2020-03-16

## 2020-03-16 VITALS
HEART RATE: 79 BPM | SYSTOLIC BLOOD PRESSURE: 132 MMHG | WEIGHT: 173 LBS | OXYGEN SATURATION: 97 % | BODY MASS INDEX: 27.15 KG/M2 | HEIGHT: 67 IN | RESPIRATION RATE: 16 BRPM | DIASTOLIC BLOOD PRESSURE: 78 MMHG

## 2020-03-16 DIAGNOSIS — I10 ESSENTIAL HYPERTENSION: Primary | ICD-10-CM

## 2020-03-16 DIAGNOSIS — I49.3 PVC'S (PREMATURE VENTRICULAR CONTRACTIONS): ICD-10-CM

## 2020-03-16 NOTE — PROGRESS NOTES
HISTORY OF PRESENT ILLNESS  Garcia Acosta is a 66 y.o. female     SUMMARY:   Problem List  Date Reviewed: 3/16/2020          Codes Class Noted    Spinal stenosis of lumbar region ICD-10-CM: M48.061  ICD-9-CM: 724.02  7/10/2017        Bilateral breast cysts ICD-10-CM: N60.01, N60.02  ICD-9-CM: 610.0  4/17/2017        GERD (gastroesophageal reflux disease) ICD-10-CM: K21.9  ICD-9-CM: 530.81  9/10/2015        HTN (hypertension) ICD-10-CM: I10  ICD-9-CM: 401.9  9/10/2015        HLD (hyperlipidemia) ICD-10-CM: E78.5  ICD-9-CM: 272.4  9/10/2015        Polycystic kidney disease ICD-10-CM: Q61.3  ICD-9-CM: 753.12  9/10/2015    Overview Addendum 9/10/2015  3:13 PM by Dianelys Booker     Followed by [facility]              PVC's (premature ventricular contractions) ICD-10-CM: I49.3  ICD-9-CM: 427.69  9/10/2015        RBBB ICD-10-CM: I45.10  ICD-9-CM: 426.4  9/10/2015              Current Outpatient Medications on File Prior to Visit   Medication Sig    gabapentin (NEURONTIN) 100 mg capsule TAKE 3 CAPSULES BY MOUTH AT BEDTIME (Patient taking differently: Taking 4 caps at night)    ezetimibe (ZETIA) 10 mg tablet TAKE 1 TABLET BY MOUTH ONCE DAILY    RABEprazole (ACIPHEX) 20 mg tablet Take 1 Tab by mouth daily. Brand Only    lisinopril (PRINIVIL, ZESTRIL) 5 mg tablet TAKE 1 TAB BY MOUTH DAILY.  omega-3 acid ethyl esters (LOVAZA) 1 gram capsule take 2 capsules by mouth twice a day with meals    metoprolol tartrate (LOPRESSOR) 25 mg tablet Take 1 & 1/2 tablet by mouth every day    cyanocobalamin (VITAMIN B-12) 1,000 mcg tablet Take 1,000 mcg by mouth daily.  melatonin 10 mg cap Take 1 Tab by mouth nightly.  acetaminophen (TYLENOL EXTRA STRENGTH) 500 mg tablet Take 1,000 mg by mouth nightly.  OTHER Take  by mouth daily. 2 gtts in each eye  Indications: Flax    TURMERIC ROOT EXTRACT PO Take  by mouth two (2) times a day.  coenzyme q10-vitamin e 100-100 mg-unit cap Take  by mouth daily.     Cholecalciferol, Vitamin D3, (VITAMIN D3) 2,000 unit cap capsule Take  by mouth daily.  estradiol (ESTRACE) 0.01 % (0.1 mg/gram) vaginal cream Use twice weekly.  fexofenadine (ALLEGRA) 180 mg tablet Take  by mouth as needed. No current facility-administered medications on file prior to visit. CARDIOLOGY STUDIES TO DATE:  No specialty comments available. Chief Complaint   Patient presents with    Irregular Heart Beat     HPI :  Ms. Kelsey Nino is doing well. She is active but not exercising because she does not want to go to the  with the coronavirus. Blood pressures under good control and she and her  are both on Nutrisystem's and losing weight. CARDIAC ROS:   negative for chest pain, dyspnea, palpitations, syncope, orthopnea, paroxysmal nocturnal dyspnea, exertional chest pressure/discomfort, claudication, lower extremity edema    Family History   Problem Relation Age of Onset    Cancer Brother     Heart Disease Mother     Hypertension Mother     Breast Cancer Maternal Grandmother         age unknown    Breast Cancer Other         age unknown       Past Medical History:   Diagnosis Date    Hypercholesterolemia     Hypertension     Joint pain     [facility] ([facility] [address]     Menopause     LMP-early 46s?  Polycystic kidney disease     Polycystic kidney disease     Restless leg syndrome        GENERAL ROS:  A comprehensive review of systems was negative except for that written in the HPI.     Visit Vitals  /78 (BP 1 Location: Left arm, BP Patient Position: Sitting)   Pulse 79   Resp 16   Ht 5' 7\" (1.702 m)   Wt 173 lb (78.5 kg)   SpO2 97%   BMI 27.10 kg/m²       Wt Readings from Last 3 Encounters:   03/16/20 173 lb (78.5 kg)   03/14/19 180 lb 9.6 oz (81.9 kg)   03/05/19 182 lb 12.8 oz (82.9 kg)            BP Readings from Last 3 Encounters:   03/16/20 132/78   03/14/19 140/80   03/05/19 158/90       PHYSICAL EXAM  General appearance: alert, cooperative, no distress, appears stated age  Neurologic: Alert and oriented X 3  Neck: supple, symmetrical, trachea midline, no adenopathy, no carotid bruit and no JVD  Lungs: clear to auscultation bilaterally  Heart: regular rate and rhythm, S1, S2 normal, no murmur, click, rub or gallop  Extremities: extremities normal, atraumatic, no cyanosis or edema    Lab Results   Component Value Date/Time    Cholesterol, total 242 (H) 03/06/2019 12:55 PM    Cholesterol, total 305 (H) 07/11/2018 09:24 AM    Cholesterol, total 211 (H) 01/08/2016 07:54 AM    HDL Cholesterol 66 03/06/2019 12:55 PM    HDL Cholesterol 67 07/11/2018 09:24 AM    HDL Cholesterol 74 01/08/2016 07:54 AM    LDL, calculated 141 (H) 03/06/2019 12:55 PM    LDL, calculated 202 (H) 07/11/2018 09:24 AM    LDL, calculated 110 (H) 01/08/2016 07:54 AM    Triglyceride 176 (H) 03/06/2019 12:55 PM    Triglyceride 178 (H) 07/11/2018 09:24 AM    Triglyceride 137 01/08/2016 07:54 AM     ASSESSMENT :      Ms. Chad Castro is stable and asymptomatic at this point on a reasonable medical regimen and needs no cardiac testing. current treatment plan is effective, no change in therapy  lab results and schedule of future lab studies reviewed with patient  reviewed diet, exercise and weight control    Encounter Diagnoses   Name Primary?  Essential hypertension Yes    PVC's (premature ventricular contractions)      No orders of the defined types were placed in this encounter. Follow-up and Dispositions    · Return in about 1 year (around 3/16/2021). Desirae Roberto MD  3/16/2020  Please note that this dictation was completed with [employer], the [employer] voice recognition software. Quite often unanticipated grammatical, syntax, homophones, and other interpretive errors are inadvertently transcribed by the computer software. Please disregard these errors. Please excuse any errors that have escaped final proofreading. Thank you.

## 2021-02-24 ENCOUNTER — TRANSCRIBE ORDER (OUTPATIENT)
Dept: SCHEDULING | Age: 76
End: 2021-02-24

## 2021-02-24 DIAGNOSIS — Z12.31 OTHER SCREENING MAMMOGRAM: Primary | ICD-10-CM

## 2021-03-01 ENCOUNTER — HOSPITAL ENCOUNTER (OUTPATIENT)
Dept: MAMMOGRAPHY | Age: 76
Discharge: HOME | End: 2021-03-01
Attending: FAMILY MEDICINE
Payer: MEDICARE

## 2021-03-01 DIAGNOSIS — Z12.31 OTHER SCREENING MAMMOGRAM: ICD-10-CM

## 2021-03-01 PROCEDURE — 77063 BREAST TOMOSYNTHESIS BI: CPT

## 2021-03-10 ENCOUNTER — TELEPHONE (OUTPATIENT)
Dept: CARDIOLOGY CLINIC | Age: 76
End: 2021-03-10

## 2021-04-12 ENCOUNTER — OFFICE VISIT (OUTPATIENT)
Dept: CARDIOLOGY CLINIC | Age: 76
End: 2021-04-12
Payer: MEDICARE

## 2021-04-12 VITALS
HEIGHT: 67 IN | RESPIRATION RATE: 13 BRPM | SYSTOLIC BLOOD PRESSURE: 130 MMHG | OXYGEN SATURATION: 98 % | WEIGHT: 180.2 LBS | HEART RATE: 68 BPM | BODY MASS INDEX: 28.28 KG/M2 | DIASTOLIC BLOOD PRESSURE: 80 MMHG

## 2021-04-12 DIAGNOSIS — Z78.9 STATIN INTOLERANCE: ICD-10-CM

## 2021-04-12 DIAGNOSIS — I49.3 PVC'S (PREMATURE VENTRICULAR CONTRACTIONS): ICD-10-CM

## 2021-04-12 DIAGNOSIS — I10 ESSENTIAL HYPERTENSION: Primary | ICD-10-CM

## 2021-04-12 DIAGNOSIS — E78.2 MIXED HYPERLIPIDEMIA: ICD-10-CM

## 2021-04-12 PROCEDURE — 1090F PRES/ABSN URINE INCON ASSESS: CPT | Performed by: SPECIALIST

## 2021-04-12 PROCEDURE — G8752 SYS BP LESS 140: HCPCS | Performed by: SPECIALIST

## 2021-04-12 PROCEDURE — 99213 OFFICE O/P EST LOW 20 MIN: CPT | Performed by: SPECIALIST

## 2021-04-12 PROCEDURE — G0463 HOSPITAL OUTPT CLINIC VISIT: HCPCS | Performed by: SPECIALIST

## 2021-04-12 PROCEDURE — G8419 CALC BMI OUT NRM PARAM NOF/U: HCPCS | Performed by: SPECIALIST

## 2021-04-12 PROCEDURE — G8399 PT W/DXA RESULTS DOCUMENT: HCPCS | Performed by: SPECIALIST

## 2021-04-12 PROCEDURE — 1101F PT FALLS ASSESS-DOCD LE1/YR: CPT | Performed by: SPECIALIST

## 2021-04-12 PROCEDURE — G8536 NO DOC ELDER MAL SCRN: HCPCS | Performed by: SPECIALIST

## 2021-04-12 PROCEDURE — G8510 SCR DEP NEG, NO PLAN REQD: HCPCS | Performed by: SPECIALIST

## 2021-04-12 PROCEDURE — G8754 DIAS BP LESS 90: HCPCS | Performed by: SPECIALIST

## 2021-04-12 PROCEDURE — G8427 DOCREV CUR MEDS BY ELIG CLIN: HCPCS | Performed by: SPECIALIST

## 2021-04-12 RX ORDER — AMPICILLIN TRIHYDRATE 250 MG
600 CAPSULE ORAL
COMMUNITY

## 2021-04-12 RX ORDER — FLUTICASONE FUROATE 27.5 UG/1
2 SPRAY, METERED NASAL AS NEEDED
COMMUNITY

## 2021-04-12 RX ORDER — KETOTIFEN FUMARATE 0.35 MG/ML
1 SOLUTION/ DROPS OPHTHALMIC AS NEEDED
COMMUNITY

## 2021-04-12 NOTE — PROGRESS NOTES
HISTORY OF PRESENT ILLNESS  Devorah Bautista is a 66 y.o. female     SUMMARY:   Problem List  Date Reviewed: 4/12/2021          Codes Class Noted    Spinal stenosis of lumbar region ICD-10-CM: M48.061  ICD-9-CM: 724.02  7/10/2017        Bilateral breast cysts ICD-10-CM: N60.01, N60.02  ICD-9-CM: 610.0  4/17/2017        GERD (gastroesophageal reflux disease) ICD-10-CM: K21.9  ICD-9-CM: 530.81  9/10/2015        HTN (hypertension) ICD-10-CM: I10  ICD-9-CM: 401.9  9/10/2015        HLD (hyperlipidemia) ICD-10-CM: E78.5  ICD-9-CM: 272.4  9/10/2015        Polycystic kidney disease ICD-10-CM: Q61.3  ICD-9-CM: 753.12  9/10/2015    Overview Addendum 9/10/2015  3:13 PM by Lara Matt     Followed by [facility]              PVC's (premature ventricular contractions) ICD-10-CM: I49.3  ICD-9-CM: 427.69  9/10/2015        RBBB ICD-10-CM: I45.10  ICD-9-CM: 426.4  9/10/2015              Current Outpatient Medications on File Prior to Visit   Medication Sig    fluticasone (Flonase Sensimist) 27.5 mcg/actuation nasal spray 2 Sprays by Nasal route as needed for Rhinitis.  ketotifen (Zaditor) 0.025 % (0.035 %) ophthalmic solution Administer 1 Drop to both eyes as needed.  red yeast rice extract 600 mg cap Take 600 mg by mouth. occasional    gabapentin (NEURONTIN) 100 mg capsule TAKE 3 CAPSULES BY MOUTH AT BEDTIME (Patient taking differently: Taking 4 caps at night)    ezetimibe (ZETIA) 10 mg tablet TAKE 1 TABLET BY MOUTH ONCE DAILY    RABEprazole (ACIPHEX) 20 mg tablet Take 1 Tab by mouth daily. Brand Only    lisinopril (PRINIVIL, ZESTRIL) 5 mg tablet TAKE 1 TAB BY MOUTH DAILY.  metoprolol tartrate (LOPRESSOR) 25 mg tablet Take 1 & 1/2 tablet by mouth every day    cyanocobalamin (VITAMIN B-12) 1,000 mcg tablet Take 1,000 mcg by mouth daily.  melatonin 10 mg cap Take 1 Tab by mouth nightly.  acetaminophen (TYLENOL EXTRA STRENGTH) 500 mg tablet Take 1,000 mg by mouth nightly.     OTHER Take  by mouth daily. 2 gtts in each eye  Indications: Flax    TURMERIC ROOT EXTRACT PO Take  by mouth two (2) times a day.  coenzyme q10-vitamin e 100-100 mg-unit cap Take  by mouth daily.  Cholecalciferol, Vitamin D3, (VITAMIN D3) 2,000 unit cap capsule Take  by mouth daily.  estradiol (ESTRACE) 0.01 % (0.1 mg/gram) vaginal cream Use twice weekly.  fexofenadine (ALLEGRA) 180 mg tablet Take  by mouth as needed. No current facility-administered medications on file prior to visit. CARDIOLOGY STUDIES TO DATE:  No specialty comments available. Chief Complaint   Patient presents with    Follow-up     HPI :  She is doing quite well though since we last met she was diagnosed with polymyalgia rheumatica and she is now on prednisone and hopes to be tapering soon. She is somewhat active and getting some exercise though not as much as she is like and she has gained weight. She was briefly off [place] because they thought that might be causing her muscle soreness but now she is back on it and has follow-up with her primary care in the near future for repeat blood work. Blood pressure is well controlled  CARDIAC ROS:   negative for chest pain, dyspnea, palpitations, syncope, orthopnea, paroxysmal nocturnal dyspnea, exertional chest pressure/discomfort, claudication, lower extremity edema    Family History   Problem Relation Age of Onset    Cancer Brother     Heart Disease Mother     Hypertension Mother     Breast Cancer Maternal Grandmother         age unknown    Breast Cancer Other         age unknown       Past Medical History:   Diagnosis Date    Hypercholesterolemia     Hypertension     Joint pain     [facility] ([facility] [address]     Menopause     LMP-early 46s?  Polycystic kidney disease     Polycystic kidney disease     Restless leg syndrome        GENERAL ROS:  A comprehensive review of systems was negative except for that written in the HPI.     Visit Vitals  /80 (BP 1 Location: Left arm, BP Patient Position: Sitting, BP Cuff Size: Adult)   Pulse 68   Resp 13   Ht 5' 7\" (1.702 m)   Wt 180 lb 3.2 oz (81.7 kg)   SpO2 98%   BMI 28.22 kg/m²       Wt Readings from Last 3 Encounters:   04/12/21 180 lb 3.2 oz (81.7 kg)   03/16/20 173 lb (78.5 kg)   03/14/19 180 lb 9.6 oz (81.9 kg)            BP Readings from Last 3 Encounters:   04/12/21 130/80   03/16/20 132/78   03/14/19 140/80       PHYSICAL EXAM  General appearance: alert, cooperative, no distress, appears stated age  Neurologic: Alert and oriented X 3  Neck: supple, symmetrical, trachea midline, no adenopathy, no carotid bruit and no JVD  Lungs: clear to auscultation bilaterally  Heart: regular rate and rhythm, S1, S2 normal, no murmur, click, rub or gallop  Extremities: extremities normal, atraumatic, no cyanosis or edema    Lab Results   Component Value Date/Time    Cholesterol, total 242 (H) 03/06/2019 12:55 PM    Cholesterol, total 305 (H) 07/11/2018 09:24 AM    Cholesterol, total 211 (H) 01/08/2016 07:54 AM    HDL Cholesterol 66 03/06/2019 12:55 PM    HDL Cholesterol 67 07/11/2018 09:24 AM    HDL Cholesterol 74 01/08/2016 07:54 AM    LDL, calculated 141 (H) 03/06/2019 12:55 PM    LDL, calculated 202 (H) 07/11/2018 09:24 AM    LDL, calculated 110 (H) 01/08/2016 07:54 AM    Triglyceride 176 (H) 03/06/2019 12:55 PM    Triglyceride 178 (H) 07/11/2018 09:24 AM    Triglyceride 137 01/08/2016 07:54 AM     ASSESSMENT :      She is stable and asymptomatic, well compensated on a good medical regimen and needs no cardiac testing at this time. current treatment plan is effective, no change in therapy  lab results and schedule of future lab studies reviewed with patient  reviewed diet, exercise and weight control    Encounter Diagnoses   Name Primary?     Essential hypertension Yes    PVC's (premature ventricular contractions)     Mixed hyperlipidemia     Statin intolerance      Orders Placed This Encounter    fluticasone (Flonase Sensimist) 27.5 mcg/actuation nasal spray    ketotifen (Zaditor) 0.025 % (0.035 %) ophthalmic solution    red yeast rice extract 600 mg cap       Follow-up and Dispositions    · Return in about 1 year (around 4/12/2022). Mar Sands MD  4/12/2021  Please note that this dictation was completed with [employer], the computer voice recognition software. Quite often unanticipated grammatical, syntax, homophones, and other interpretive errors are inadvertently transcribed by the computer software. Please disregard these errors. Please excuse any errors that have escaped final proofreading. Thank you.

## 2022-02-25 ENCOUNTER — TRANSCRIBE ORDER (OUTPATIENT)
Dept: SCHEDULING | Age: 77
End: 2022-02-25

## 2022-02-25 DIAGNOSIS — Z12.31 VISIT FOR SCREENING MAMMOGRAM: Primary | ICD-10-CM

## 2022-03-19 PROBLEM — N60.02 BILATERAL BREAST CYSTS: Status: ACTIVE | Noted: 2017-04-17

## 2022-03-19 PROBLEM — N60.01 BILATERAL BREAST CYSTS: Status: ACTIVE | Noted: 2017-04-17

## 2022-03-19 PROBLEM — M48.061 SPINAL STENOSIS OF LUMBAR REGION: Status: ACTIVE | Noted: 2017-07-10

## 2022-04-08 ENCOUNTER — HOSPITAL ENCOUNTER (OUTPATIENT)
Dept: MAMMOGRAPHY | Age: 77
Discharge: HOME | End: 2022-04-08
Attending: FAMILY MEDICINE
Payer: MEDICARE

## 2022-04-08 DIAGNOSIS — Z12.31 VISIT FOR SCREENING MAMMOGRAM: ICD-10-CM

## 2022-04-08 PROCEDURE — 77063 BREAST TOMOSYNTHESIS BI: CPT

## 2023-02-22 ENCOUNTER — OFFICE VISIT (OUTPATIENT)
Dept: CARDIOLOGY CLINIC | Age: 78
End: 2023-02-22
Payer: MEDICARE

## 2023-02-22 VITALS
OXYGEN SATURATION: 97 % | SYSTOLIC BLOOD PRESSURE: 136 MMHG | WEIGHT: 180 LBS | DIASTOLIC BLOOD PRESSURE: 76 MMHG | HEIGHT: 67 IN | BODY MASS INDEX: 28.25 KG/M2 | RESPIRATION RATE: 14 BRPM | HEART RATE: 88 BPM

## 2023-02-22 DIAGNOSIS — T46.6X5A MYALGIA DUE TO HMG COA REDUCTASE INHIBITOR: ICD-10-CM

## 2023-02-22 DIAGNOSIS — I49.3 PVC'S (PREMATURE VENTRICULAR CONTRACTIONS): ICD-10-CM

## 2023-02-22 DIAGNOSIS — E78.2 MIXED HYPERLIPIDEMIA: ICD-10-CM

## 2023-02-22 DIAGNOSIS — I10 PRIMARY HYPERTENSION: Primary | ICD-10-CM

## 2023-02-22 DIAGNOSIS — M79.10 MYALGIA DUE TO HMG COA REDUCTASE INHIBITOR: ICD-10-CM

## 2023-02-22 PROCEDURE — 1123F ACP DISCUSS/DSCN MKR DOCD: CPT | Performed by: SPECIALIST

## 2023-02-22 PROCEDURE — 3075F SYST BP GE 130 - 139MM HG: CPT | Performed by: SPECIALIST

## 2023-02-22 PROCEDURE — 3078F DIAST BP <80 MM HG: CPT | Performed by: SPECIALIST

## 2023-02-22 PROCEDURE — G0463 HOSPITAL OUTPT CLINIC VISIT: HCPCS | Performed by: SPECIALIST

## 2023-02-22 PROCEDURE — G8510 SCR DEP NEG, NO PLAN REQD: HCPCS | Performed by: SPECIALIST

## 2023-02-22 PROCEDURE — G8399 PT W/DXA RESULTS DOCUMENT: HCPCS | Performed by: SPECIALIST

## 2023-02-22 PROCEDURE — 99213 OFFICE O/P EST LOW 20 MIN: CPT | Performed by: SPECIALIST

## 2023-02-22 PROCEDURE — G8417 CALC BMI ABV UP PARAM F/U: HCPCS | Performed by: SPECIALIST

## 2023-02-22 PROCEDURE — 1090F PRES/ABSN URINE INCON ASSESS: CPT | Performed by: SPECIALIST

## 2023-02-22 PROCEDURE — 1101F PT FALLS ASSESS-DOCD LE1/YR: CPT | Performed by: SPECIALIST

## 2023-02-22 PROCEDURE — G8427 DOCREV CUR MEDS BY ELIG CLIN: HCPCS | Performed by: SPECIALIST

## 2023-02-22 PROCEDURE — G8536 NO DOC ELDER MAL SCRN: HCPCS | Performed by: SPECIALIST

## 2023-02-22 NOTE — PROGRESS NOTES
HISTORY OF PRESENT ILLNESS  Angeles Landrum is a 76 y.o. female     SUMMARY:   Problem List  Date Reviewed: 2/22/2023            Codes Class Noted    Spinal stenosis of lumbar region ICD-10-CM: M48.061  ICD-9-CM: 724.02  7/10/2017        Bilateral breast cysts ICD-10-CM: N60.01, N60.02  ICD-9-CM: 610.0  4/17/2017        GERD (gastroesophageal reflux disease) ICD-10-CM: K21.9  ICD-9-CM: 530.81  9/10/2015        HTN (hypertension) ICD-10-CM: I10  ICD-9-CM: 401.9  9/10/2015        HLD (hyperlipidemia) ICD-10-CM: E78.5  ICD-9-CM: 272.4  9/10/2015        Polycystic kidney disease ICD-10-CM: Q61.3  ICD-9-CM: 753.12  9/10/2015    Overview Addendum 9/10/2015  3:13 PM by Divine Lynn     Followed by [facility]              PVC's (premature ventricular contractions) ICD-10-CM: I49.3  ICD-9-CM: 427.69  9/10/2015        RBBB ICD-10-CM: I45.10  ICD-9-CM: 426.4  9/10/2015           Current Outpatient Medications on File Prior to Visit   Medication Sig    fluticasone (Flonase Sensimist) 27.5 mcg/actuation nasal spray 2 Sprays by Nasal route as needed for Rhinitis.    ketotifen (ZADITOR) 0.025 % (0.035 %) ophthalmic solution Administer 1 Drop to both eyes as needed. red yeast rice extract 600 mg cap Take 600 mg by mouth. occasional    gabapentin (NEURONTIN) 100 mg capsule TAKE 3 CAPSULES BY MOUTH AT BEDTIME (Patient taking differently: Taking 4 caps at night)    ezetimibe (ZETIA) 10 mg tablet TAKE 1 TABLET BY MOUTH ONCE DAILY    RABEprazole (ACIPHEX) 20 mg tablet Take 1 Tab by mouth daily. Brand Only    lisinopril (PRINIVIL, ZESTRIL) 5 mg tablet TAKE 1 TAB BY MOUTH DAILY. metoprolol tartrate (LOPRESSOR) 25 mg tablet Take 1 & 1/2 tablet by mouth every day    cyanocobalamin 1,000 mcg tablet Take 1,000 mcg by mouth daily. acetaminophen (TYLENOL) 500 mg tablet Take 1,000 mg by mouth nightly. TURMERIC ROOT EXTRACT PO Take  by mouth two (2) times a day.     coenzyme q10-vitamin e 100-100 mg-unit cap Take  by mouth daily. Cholecalciferol, Vitamin D3, (VITAMIN D3) 2,000 unit cap capsule Take  by mouth daily. estradioL (ESTRACE) 0.01 % (0.1 mg/gram) vaginal cream Use twice weekly. fexofenadine (ALLEGRA) 180 mg tablet Take  by mouth as needed. No current facility-administered medications on file prior to visit. CARDIOLOGY STUDIES TO DATE:  No specialty comments available. Chief Complaint   Patient presents with    Follow-up     Patient reports feeling lightheaded and/or dizzy, sporadically. HPI :  She had back surgery a few months ago and just now has been released to start light exercise. With her lack of activity she has noticed a little bit of dyspnea on exertion but nothing that really stops her and no other symptoms. She occasionally will get some ringing in her left ear followed by some pulsations and then she will get dizzy and feel off balance. She has not felt faint. She sometimes has low blood pressure but that is inconsistently associated with dizziness based on the records that she brought with her today. Her primary care is following her lipids. CARDIAC ROS:   negative for chest pain, palpitations, syncope, orthopnea, paroxysmal nocturnal dyspnea, exertional chest pressure/discomfort, claudication, lower extremity edema    Family History   Problem Relation Age of Onset    Cancer Brother     Heart Disease Mother     Hypertension Mother     Breast Cancer Maternal Grandmother         age unknown    Breast Cancer Other         age unknown       Past Medical History:   Diagnosis Date    Hypercholesterolemia     Hypertension     Joint pain     Melanoma ([facility] [address]     Menopause     LMP-early 46s? Polycystic kidney disease     Polycystic kidney disease     Polymyalgia rheumatica (HCC)     Restless leg syndrome        GENERAL ROS:  A comprehensive review of systems was negative except for that written in the HPI.     Visit Vitals  /76 (BP 1 Location: Left upper arm, BP Patient Position: Sitting, BP Cuff Size: Large adult)   Pulse 88   Resp 14   Ht 5' 7\" (1.702 m)   Wt 180 lb (81.6 kg)   SpO2 97%   BMI 28.19 kg/m²       Wt Readings from Last 3 Encounters:   02/22/23 180 lb (81.6 kg)   04/12/21 180 lb 3.2 oz (81.7 kg)   03/16/20 173 lb (78.5 kg)            BP Readings from Last 3 Encounters:   02/22/23 136/76   04/12/21 130/80   03/16/20 132/78       PHYSICAL EXAM  General appearance: alert, cooperative, no distress, appears stated age  Neurologic: Alert and oriented X 3  Neck: supple, symmetrical, trachea midline, no adenopathy, no carotid bruit, and no JVD  Lungs: clear to auscultation bilaterally  Heart: regular rate and rhythm, S1, S2 normal, no murmur, click, rub or gallop  Extremities: extremities normal, atraumatic, no cyanosis or edema    Lab Results   Component Value Date/Time    Cholesterol, total 242 (H) 03/06/2019 12:55 PM    Cholesterol, total 305 (H) 07/11/2018 09:24 AM    Cholesterol, total 211 (H) 01/08/2016 07:54 AM    HDL Cholesterol 66 03/06/2019 12:55 PM    HDL Cholesterol 67 07/11/2018 09:24 AM    HDL Cholesterol 74 01/08/2016 07:54 AM    LDL, calculated 141 (H) 03/06/2019 12:55 PM    LDL, calculated 202 (H) 07/11/2018 09:24 AM    LDL, calculated 110 (H) 01/08/2016 07:54 AM    Triglyceride 176 (H) 03/06/2019 12:55 PM    Triglyceride 178 (H) 07/11/2018 09:24 AM    Triglyceride 137 01/08/2016 07:54 AM     ASSESSMENT :      I do not think her dizziness is orthostasis given her other symptoms and based on the log she brought in. I suspect she is got a middle or inner ear problem and she is going to make an appointment with her 's ENT to try to sort through that. We talked about things to look out for going forward.   At this point she needs no specific cardiac testing  current treatment plan is effective, no change in therapy  lab results and schedule of future lab studies reviewed with patient  reviewed diet, exercise and weight control    Encounter Diagnoses   Name Primary? 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 Follow-up and Dispositions    Return in about 1 year (around 2/22/2024). Mono Brown MD  2/22/2023  Please note that this dictation was completed with Alerts, the computer voice recognition software. Quite often unanticipated grammatical, syntax, homophones, and other interpretive errors are inadvertently transcribed by the computer software. Please disregard these errors. Please excuse any errors that have escaped final proofreading. Thank you.

## 2023-03-13 ENCOUNTER — TRANSCRIBE ORDER (OUTPATIENT)
Dept: SCHEDULING | Age: 78
End: 2023-03-13

## 2023-03-13 DIAGNOSIS — Z12.31 SCREENING MAMMOGRAM FOR HIGH-RISK PATIENT: Primary | ICD-10-CM

## 2023-04-23 DIAGNOSIS — Z12.31 SCREENING MAMMOGRAM FOR HIGH-RISK PATIENT: Primary | ICD-10-CM

## 2023-05-17 ENCOUNTER — HOSPITAL ENCOUNTER (OUTPATIENT)
Facility: HOSPITAL | Age: 78
Discharge: HOME | End: 2023-05-20
Payer: MEDICARE

## 2023-05-17 DIAGNOSIS — Z12.31 SCREENING MAMMOGRAM FOR HIGH-RISK PATIENT: ICD-10-CM

## 2023-05-17 PROCEDURE — 77063 BREAST TOMOSYNTHESIS BI: CPT

## 2023-05-22 RX ORDER — EZETIMIBE 10 MG/1
1 TABLET ORAL DAILY
COMMUNITY
Start: 2019-06-18

## 2023-05-22 RX ORDER — RABEPRAZOLE SODIUM 20 MG/1
20 TABLET, DELAYED RELEASE ORAL DAILY
COMMUNITY
Start: 2019-05-14

## 2023-05-22 RX ORDER — ESTRADIOL 0.1 MG/G
CREAM VAGINAL
COMMUNITY

## 2023-05-22 RX ORDER — KETOTIFEN FUMARATE 0.35 MG/ML
1 SOLUTION/ DROPS OPHTHALMIC PRN
COMMUNITY

## 2023-05-22 RX ORDER — CRANBERRY FRUIT EXTRACT 200 MG
600 CAPSULE ORAL
COMMUNITY

## 2023-05-22 RX ORDER — GABAPENTIN 100 MG/1
CAPSULE ORAL
COMMUNITY
Start: 2019-07-26

## 2023-05-22 RX ORDER — FLUTICASONE FUROATE 27.5 UG/1
2 SPRAY, METERED NASAL PRN
COMMUNITY

## 2023-05-22 RX ORDER — LISINOPRIL 5 MG/1
TABLET ORAL
COMMUNITY
Start: 2019-03-14

## 2023-05-22 RX ORDER — FEXOFENADINE HCL 180 MG/1
TABLET ORAL PRN
COMMUNITY

## 2023-05-22 RX ORDER — ACETAMINOPHEN 500 MG
1000 TABLET ORAL
COMMUNITY

## 2024-03-28 ENCOUNTER — OFFICE VISIT (OUTPATIENT)
Age: 79
End: 2024-03-28
Payer: MEDICARE

## 2024-03-28 VITALS
WEIGHT: 187.6 LBS | HEIGHT: 67 IN | DIASTOLIC BLOOD PRESSURE: 32 MMHG | HEART RATE: 89 BPM | SYSTOLIC BLOOD PRESSURE: 128 MMHG | BODY MASS INDEX: 29.44 KG/M2 | OXYGEN SATURATION: 97 %

## 2024-03-28 DIAGNOSIS — M79.10 MYALGIA DUE TO HMG COA REDUCTASE INHIBITOR: ICD-10-CM

## 2024-03-28 DIAGNOSIS — I10 ESSENTIAL (PRIMARY) HYPERTENSION: Primary | ICD-10-CM

## 2024-03-28 DIAGNOSIS — T46.6X5A MYALGIA DUE TO HMG COA REDUCTASE INHIBITOR: ICD-10-CM

## 2024-03-28 DIAGNOSIS — E78.2 MIXED HYPERLIPIDEMIA: ICD-10-CM

## 2024-03-28 DIAGNOSIS — I49.3 VENTRICULAR PREMATURE DEPOLARIZATION: ICD-10-CM

## 2024-03-28 PROCEDURE — 1123F ACP DISCUSS/DSCN MKR DOCD: CPT | Performed by: SPECIALIST

## 2024-03-28 PROCEDURE — G8419 CALC BMI OUT NRM PARAM NOF/U: HCPCS | Performed by: SPECIALIST

## 2024-03-28 PROCEDURE — 99214 OFFICE O/P EST MOD 30 MIN: CPT | Performed by: SPECIALIST

## 2024-03-28 PROCEDURE — G8399 PT W/DXA RESULTS DOCUMENT: HCPCS | Performed by: SPECIALIST

## 2024-03-28 PROCEDURE — G8427 DOCREV CUR MEDS BY ELIG CLIN: HCPCS | Performed by: SPECIALIST

## 2024-03-28 PROCEDURE — 1036F TOBACCO NON-USER: CPT | Performed by: SPECIALIST

## 2024-03-28 PROCEDURE — G8484 FLU IMMUNIZE NO ADMIN: HCPCS | Performed by: SPECIALIST

## 2024-03-28 PROCEDURE — 3074F SYST BP LT 130 MM HG: CPT | Performed by: SPECIALIST

## 2024-03-28 PROCEDURE — 3078F DIAST BP <80 MM HG: CPT | Performed by: SPECIALIST

## 2024-03-28 PROCEDURE — 1090F PRES/ABSN URINE INCON ASSESS: CPT | Performed by: SPECIALIST

## 2024-03-28 RX ORDER — OMEGA-3-ACID ETHYL ESTERS 1 G/1
2 CAPSULE, LIQUID FILLED ORAL 2 TIMES DAILY
COMMUNITY

## 2024-03-28 RX ORDER — PREGABALIN 150 MG/1
150 CAPSULE ORAL 2 TIMES DAILY
COMMUNITY

## 2024-03-28 ASSESSMENT — PATIENT HEALTH QUESTIONNAIRE - PHQ9
SUM OF ALL RESPONSES TO PHQ QUESTIONS 1-9: 0
1. LITTLE INTEREST OR PLEASURE IN DOING THINGS: NOT AT ALL
SUM OF ALL RESPONSES TO PHQ QUESTIONS 1-9: 0
SUM OF ALL RESPONSES TO PHQ QUESTIONS 1-9: 0
2. FEELING DOWN, DEPRESSED OR HOPELESS: NOT AT ALL
SUM OF ALL RESPONSES TO PHQ QUESTIONS 1-9: 0
SUM OF ALL RESPONSES TO PHQ9 QUESTIONS 1 & 2: 0

## 2024-03-28 NOTE — PROGRESS NOTES
HISTORY OF PRESENT ILLNESS  Marci Kraus is a 78 y.o. 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    Chief Complaint   Patient presents with    Annual Exam     Essential (primary) hypertension         HPI :  He is doing well with no cardiac complaints or problems with her medications.  Blood pressure is well-controlled.  She still has a lot of issues with radicular pain in her leg so the only exercise she is really doing is yoga and water aerobics and she is gained a few pounds since we last met.  They spent some time in the villages around [facility] and they are going up to his [facility] in July.  Her primary care is following her lipids.  No recent PVCs or palpitations    CARDIAC ROS:   negative for chest pain, claudication, dyspnea, lower extremity edema, orthopnea, paroxysmal nocturnal dyspnea, and syncope    Family History   Problem Relation Age of Onset    Heart Disease Mother     Breast Cancer Other         age unknown    Breast Cancer Maternal Grandmother         age unknown    Hypertension Mother     Cancer Brother        Past Medical History:   Diagnosis Date    Hypercholesterolemia     Hypertension     Joint pain     Melanoma (HCC)     Menopause     LMP-early 50s?    Polycystic kidney disease     Polycystic kidney disease     Polymyalgia rheumatica (HCC)     Restless leg syndrome        Specialty Problems          Cardiology Problems    HLD (hyperlipidemia)        HTN (hypertension)        PVC's (premature ventricular contractions)        RBBB            GENERAL ROS:  A comprehensive review of systems was negative except

## 2024-05-02 PROBLEM — E86.0 DEHYDRATION: Status: ACTIVE | Noted: 2024-05-02

## 2024-05-02 RX ORDER — ONDANSETRON 2 MG/ML
8 INJECTION INTRAMUSCULAR; INTRAVENOUS
OUTPATIENT
Start: 2024-05-03

## 2024-05-02 RX ORDER — ALBUTEROL SULFATE 90 UG/1
4 AEROSOL, METERED RESPIRATORY (INHALATION) PRN
OUTPATIENT
Start: 2024-05-03

## 2024-05-02 RX ORDER — ACETAMINOPHEN 325 MG/1
650 TABLET ORAL
OUTPATIENT
Start: 2024-05-03

## 2024-05-02 RX ORDER — DIPHENHYDRAMINE HYDROCHLORIDE 50 MG/ML
50 INJECTION INTRAMUSCULAR; INTRAVENOUS
OUTPATIENT
Start: 2024-05-03

## 2024-05-02 RX ORDER — EPINEPHRINE 1 MG/ML
0.3 INJECTION, SOLUTION INTRAMUSCULAR; SUBCUTANEOUS PRN
OUTPATIENT
Start: 2024-05-03

## 2024-05-02 RX ORDER — SODIUM CHLORIDE 9 MG/ML
INJECTION, SOLUTION INTRAVENOUS CONTINUOUS
OUTPATIENT
Start: 2024-05-03

## 2024-05-03 ENCOUNTER — HOSPITAL ENCOUNTER (OUTPATIENT)
Facility: HOSPITAL | Age: 79
Setting detail: INFUSION SERIES
Discharge: HOME | End: 2024-05-03
Payer: MEDICARE

## 2024-05-03 VITALS
HEART RATE: 73 BPM | SYSTOLIC BLOOD PRESSURE: 129 MMHG | RESPIRATION RATE: 20 BRPM | TEMPERATURE: 97.8 F | DIASTOLIC BLOOD PRESSURE: 68 MMHG

## 2024-05-03 DIAGNOSIS — E86.0 DEHYDRATION: Primary | ICD-10-CM

## 2024-05-03 PROCEDURE — 96361 HYDRATE IV INFUSION ADD-ON: CPT

## 2024-05-03 PROCEDURE — 2580000003 HC RX 258: Performed by: INTERNAL MEDICINE

## 2024-05-03 PROCEDURE — 96360 HYDRATION IV INFUSION INIT: CPT

## 2024-05-03 RX ORDER — SODIUM CHLORIDE 0.9 % (FLUSH) 0.9 %
5-40 SYRINGE (ML) INJECTION PRN
OUTPATIENT
Start: 2024-05-03

## 2024-05-03 RX ORDER — DIPHENHYDRAMINE HYDROCHLORIDE 50 MG/ML
50 INJECTION INTRAMUSCULAR; INTRAVENOUS
OUTPATIENT
Start: 2024-05-03

## 2024-05-03 RX ORDER — SODIUM CHLORIDE 9 MG/ML
INJECTION, SOLUTION INTRAVENOUS CONTINUOUS
OUTPATIENT
Start: 2024-05-03

## 2024-05-03 RX ORDER — 0.9 % SODIUM CHLORIDE 0.9 %
2000 INTRAVENOUS SOLUTION INTRAVENOUS ONCE
Status: CANCELLED | OUTPATIENT
Start: 2024-05-03 | End: 2024-05-03

## 2024-05-03 RX ORDER — SODIUM CHLORIDE 9 MG/ML
5-250 INJECTION, SOLUTION INTRAVENOUS PRN
OUTPATIENT
Start: 2024-05-03

## 2024-05-03 RX ORDER — HEPARIN 100 UNIT/ML
500 SYRINGE INTRAVENOUS PRN
Status: DISCONTINUED | OUTPATIENT
Start: 2024-05-03 | End: 2024-05-04 | Stop reason: HOSPADM

## 2024-05-03 RX ORDER — SODIUM CHLORIDE 0.9 % (FLUSH) 0.9 %
5-40 SYRINGE (ML) INJECTION PRN
Status: DISCONTINUED | OUTPATIENT
Start: 2024-05-03 | End: 2024-05-04 | Stop reason: HOSPADM

## 2024-05-03 RX ORDER — HEPARIN 100 UNIT/ML
500 SYRINGE INTRAVENOUS PRN
OUTPATIENT
Start: 2024-05-03

## 2024-05-03 RX ORDER — ALBUTEROL SULFATE 90 UG/1
4 AEROSOL, METERED RESPIRATORY (INHALATION) PRN
OUTPATIENT
Start: 2024-05-03

## 2024-05-03 RX ORDER — ONDANSETRON 2 MG/ML
8 INJECTION INTRAMUSCULAR; INTRAVENOUS
OUTPATIENT
Start: 2024-05-03

## 2024-05-03 RX ORDER — EPINEPHRINE 1 MG/ML
0.3 INJECTION, SOLUTION INTRAMUSCULAR; SUBCUTANEOUS PRN
OUTPATIENT
Start: 2024-05-03

## 2024-05-03 RX ORDER — SODIUM CHLORIDE 9 MG/ML
5-250 INJECTION, SOLUTION INTRAVENOUS PRN
Status: DISCONTINUED | OUTPATIENT
Start: 2024-05-03 | End: 2024-05-04 | Stop reason: HOSPADM

## 2024-05-03 RX ORDER — 0.9 % SODIUM CHLORIDE 0.9 %
2000 INTRAVENOUS SOLUTION INTRAVENOUS ONCE
Status: COMPLETED | OUTPATIENT
Start: 2024-05-03 | End: 2024-05-03

## 2024-05-03 RX ORDER — ACETAMINOPHEN 325 MG/1
650 TABLET ORAL
OUTPATIENT
Start: 2024-05-03

## 2024-05-03 RX ADMIN — SODIUM CHLORIDE 2000 ML: 900 INJECTION, SOLUTION INTRAVENOUS at 08:09

## 2024-05-03 ASSESSMENT — PAIN DESCRIPTION - DESCRIPTORS: DESCRIPTORS: NAGGING

## 2024-05-03 ASSESSMENT — PAIN DESCRIPTION - LOCATION: LOCATION: BACK

## 2024-05-03 ASSESSMENT — PAIN DESCRIPTION - PAIN TYPE: TYPE: CHRONIC PAIN

## 2024-05-03 ASSESSMENT — PAIN DESCRIPTION - DIRECTION: RADIATING_TOWARDS: RIGHT LEG

## 2024-05-03 ASSESSMENT — PAIN SCALES - GENERAL: PAINLEVEL_OUTOF10: 5

## 2024-05-03 NOTE — PROGRESS NOTES
[facility] Peds/Adult Note                   Date: May 3, 2024    Name: Marci Kraus    MRN: [id number]         : 1945    0800 - Patient arrives for IV Hydration without acute problems. Please see Epic for complete assessment and education provided.    Vital signs stable throughout and prior to discharge. Patient tolerated procedure well and was discharged without incident. Patient is aware of no upcoming [facility] appointment and to follow up with healthcare provider.    Ms. Kraus's vitals were reviewed prior to and after treatment.   Patient Vitals for the past 12 hrs:   Temp Pulse Resp BP   24 1015 -- 73 20 129/68   24 0800 97.8 °F (36.6 °C) 73 20 131/84       Lab results were reviewed.  No results found for this or any previous visit (from the past 12 hour(s)).    Medications given:   Medications Administered         sodium chloride 0.9 % bolus 2,000 mL Admin Date  2024 Action  New Bag Dose  2,000 mL Rate  1,333.3 mL/hr Route  IntraVENous Administered By  Shoshana Pang, [facility]            Ms. Kraus tolerated the infusion, and had no complaints.    [facility]. Discharge Instructions provided to patient, patient verbalized understanding and given a copy of after visit summary.     Future Appointments   Date Time Provider Department Center   4/3/2025  1:00 PM Chriss Chaidez III, MD [facility] Pang RN  May 3, 2024  10:52 AM

## 2025-03-07 ENCOUNTER — OFFICE VISIT (OUTPATIENT)
Age: 80
End: 2025-03-07
Payer: MEDICARE

## 2025-03-07 VITALS
DIASTOLIC BLOOD PRESSURE: 88 MMHG | SYSTOLIC BLOOD PRESSURE: 150 MMHG | OXYGEN SATURATION: 98 % | HEIGHT: 67 IN | WEIGHT: 178 LBS | BODY MASS INDEX: 27.94 KG/M2 | HEART RATE: 83 BPM

## 2025-03-07 DIAGNOSIS — M79.10 MYALGIA DUE TO HMG COA REDUCTASE INHIBITOR: ICD-10-CM

## 2025-03-07 DIAGNOSIS — I10 ESSENTIAL (PRIMARY) HYPERTENSION: Primary | ICD-10-CM

## 2025-03-07 DIAGNOSIS — T46.6X5A MYALGIA DUE TO HMG COA REDUCTASE INHIBITOR: ICD-10-CM

## 2025-03-07 DIAGNOSIS — R93.1 AGATSTON CAC SCORE, >400: ICD-10-CM

## 2025-03-07 DIAGNOSIS — E78.2 MIXED HYPERLIPIDEMIA: ICD-10-CM

## 2025-03-07 DIAGNOSIS — I49.3 VENTRICULAR PREMATURE DEPOLARIZATION: ICD-10-CM

## 2025-03-07 PROCEDURE — G8419 CALC BMI OUT NRM PARAM NOF/U: HCPCS | Performed by: SPECIALIST

## 2025-03-07 PROCEDURE — 99214 OFFICE O/P EST MOD 30 MIN: CPT | Performed by: SPECIALIST

## 2025-03-07 PROCEDURE — 1090F PRES/ABSN URINE INCON ASSESS: CPT | Performed by: SPECIALIST

## 2025-03-07 PROCEDURE — G8427 DOCREV CUR MEDS BY ELIG CLIN: HCPCS | Performed by: SPECIALIST

## 2025-03-07 PROCEDURE — 1123F ACP DISCUSS/DSCN MKR DOCD: CPT | Performed by: SPECIALIST

## 2025-03-07 PROCEDURE — 3077F SYST BP >= 140 MM HG: CPT | Performed by: SPECIALIST

## 2025-03-07 PROCEDURE — G8399 PT W/DXA RESULTS DOCUMENT: HCPCS | Performed by: SPECIALIST

## 2025-03-07 PROCEDURE — 1159F MED LIST DOCD IN RCRD: CPT | Performed by: SPECIALIST

## 2025-03-07 PROCEDURE — 1160F RVW MEDS BY RX/DR IN RCRD: CPT | Performed by: SPECIALIST

## 2025-03-07 PROCEDURE — 3079F DIAST BP 80-89 MM HG: CPT | Performed by: SPECIALIST

## 2025-03-07 PROCEDURE — 1126F AMNT PAIN NOTED NONE PRSNT: CPT | Performed by: SPECIALIST

## 2025-03-07 PROCEDURE — 1036F TOBACCO NON-USER: CPT | Performed by: SPECIALIST

## 2025-03-07 ASSESSMENT — PATIENT HEALTH QUESTIONNAIRE - PHQ9
2. FEELING DOWN, DEPRESSED OR HOPELESS: NOT AT ALL
SUM OF ALL RESPONSES TO PHQ QUESTIONS 1-9: 0
1. LITTLE INTEREST OR PLEASURE IN DOING THINGS: NOT AT ALL
SUM OF ALL RESPONSES TO PHQ QUESTIONS 1-9: 0

## 2025-03-07 NOTE — PROGRESS NOTES
1. Have you been to the ER, urgent care clinic since your last visit?  Hospitalized since your last visit?No    2. Have you seen or consulted any other health care providers outside of the [facility] since your last visit?  Include any pap smears or colon screening. No

## 2025-03-07 NOTE — PROGRESS NOTES
HISTORY OF PRESENT ILLNESS  Marci Kraus is a 79 y.o. 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 Mostly in lad    Chief Complaint   Patient presents with    Annual Exam    Hypertension    Hyperlipidemia    Results     Calcium score       HPI :  She is doing well with no cardiac complaints or problems with her medications.  She has not had any recent palpitations.  She was recently started on Livalo for her hyperlipidemia, but she stopped it because she thought it made her aches and pains worse.  She has been intolerant of all other statin drugs so she is back to Zetia and red yeast rice.  Her only form of exercise is water aerobics and yoga.  She recently had a calcium score which was elevated.  Blood pressure is elevated here but typically it is fine at home and if she has whitecoat hypertension.  She brought in a lipid profile from December.  Her cholesterol is 271 triglycerides 184 HDL 67 and .  Only other lab of interest was her creatinine which was 1.56, presumably from her polycystic kidney disease.  Weight is stable    CARDIAC ROS:   negative for chest pain, claudication, dyspnea, lower extremity edema, orthopnea, paroxysmal nocturnal dyspnea, and syncope    Family History   Problem Relation Age of Onset    Heart Disease Mother     Breast Cancer Other         age unknown    Breast Cancer Maternal Grandmother         age unknown    Hypertension Mother     Cancer Brother        Past Medical History:   Diagnosis Date    Hypercholesterolemia     Hypertension     Joint pain